# Patient Record
Sex: FEMALE | URBAN - METROPOLITAN AREA
[De-identification: names, ages, dates, MRNs, and addresses within clinical notes are randomized per-mention and may not be internally consistent; named-entity substitution may affect disease eponyms.]

---

## 2017-08-23 ENCOUNTER — TRANSFERRED RECORDS (OUTPATIENT)
Dept: HEALTH INFORMATION MANAGEMENT | Facility: CLINIC | Age: 77
End: 2017-08-23

## 2017-10-26 ENCOUNTER — TRANSFERRED RECORDS (OUTPATIENT)
Dept: HEALTH INFORMATION MANAGEMENT | Facility: CLINIC | Age: 77
End: 2017-10-26

## 2017-11-02 ENCOUNTER — PRE VISIT (OUTPATIENT)
Dept: OTOLARYNGOLOGY | Facility: CLINIC | Age: 77
End: 2017-11-02

## 2017-11-02 ENCOUNTER — MEDICAL CORRESPONDENCE (OUTPATIENT)
Dept: HEALTH INFORMATION MANAGEMENT | Facility: CLINIC | Age: 77
End: 2017-11-02

## 2017-11-02 DIAGNOSIS — C05.0 SQUAMOUS CELL CARCINOMA OF HARD PALATE (H): Primary | ICD-10-CM

## 2017-11-02 NOTE — TELEPHONE ENCOUNTER
1.  Date/reason for appt: 11/10/2107, SCC of the hard palate    2.  Referring provider: Dr. Pradip Cox    3.  Call to patient (Yes / No - short description): Yes, called patient to schedule consult    4.  Previous care at / records requested from: [facility] (records received with referral, faxed request for slides)

## 2017-11-06 LAB — COPATH REPORT: NORMAL

## 2017-11-06 PROCEDURE — 00000346 ZZHCL STATISTIC REVIEW OUTSIDE SLIDES TC 88321: Performed by: OTOLARYNGOLOGY

## 2017-11-10 ENCOUNTER — HOSPITAL ENCOUNTER (OUTPATIENT)
Dept: PET IMAGING | Facility: CLINIC | Age: 77
Discharge: HOME | End: 2017-11-10
Attending: OTOLARYNGOLOGY | Admitting: OTOLARYNGOLOGY
Payer: MEDICARE

## 2017-11-10 ENCOUNTER — TEAM CONFERENCE (OUTPATIENT)
Dept: OTOLARYNGOLOGY | Facility: CLINIC | Age: 77
End: 2017-11-10
Attending: OTOLARYNGOLOGY

## 2017-11-10 ENCOUNTER — HOSPITAL ENCOUNTER (OUTPATIENT)
Facility: CLINIC | Age: 77
End: 2017-11-10
Attending: INTERNAL MEDICINE | Admitting: INTERNAL MEDICINE

## 2017-11-10 ENCOUNTER — SURGERY (OUTPATIENT)
Age: 77
End: 2017-11-10

## 2017-11-10 ENCOUNTER — HOSPITAL ENCOUNTER (OUTPATIENT)
Dept: PET IMAGING | Facility: CLINIC | Age: 77
End: 2017-11-10
Attending: OTOLARYNGOLOGY
Payer: MEDICARE

## 2017-11-10 ENCOUNTER — HOSPITAL ENCOUNTER (OUTPATIENT)
Facility: CLINIC | Age: 77
Discharge: HOME | End: 2017-11-10
Attending: INTERNAL MEDICINE | Admitting: INTERNAL MEDICINE
Payer: MEDICARE

## 2017-11-10 VITALS
WEIGHT: 180 LBS | OXYGEN SATURATION: 96 % | RESPIRATION RATE: 16 BRPM | BODY MASS INDEX: 33.13 KG/M2 | SYSTOLIC BLOOD PRESSURE: 120 MMHG | DIASTOLIC BLOOD PRESSURE: 62 MMHG | HEART RATE: 68 BPM | HEIGHT: 62 IN | TEMPERATURE: 98.2 F

## 2017-11-10 DIAGNOSIS — T18.108A FOREIGN BODY IN ESOPHAGUS, INITIAL ENCOUNTER: ICD-10-CM

## 2017-11-10 DIAGNOSIS — K20.90 ACUTE ESOPHAGITIS: ICD-10-CM

## 2017-11-10 DIAGNOSIS — C05.0 SQUAMOUS CELL CARCINOMA OF HARD PALATE (H): ICD-10-CM

## 2017-11-10 DIAGNOSIS — C05.1 SQUAMOUS CELL CARCINOMA OF SOFT PALATE (H): ICD-10-CM

## 2017-11-10 LAB
GLUCOSE BLDC GLUCOMTR-MCNC: 105 MG/DL (ref 70–99)
UPPER GI ENDOSCOPY: NORMAL

## 2017-11-10 PROCEDURE — 99285 EMERGENCY DEPT VISIT HI MDM: CPT | Mod: Z6 | Performed by: INTERNAL MEDICINE

## 2017-11-10 PROCEDURE — 78816 PET IMAGE W/CT FULL BODY: CPT | Mod: PI

## 2017-11-10 PROCEDURE — 82962 GLUCOSE BLOOD TEST: CPT

## 2017-11-10 PROCEDURE — 74176 CT ABD & PELVIS W/O CONTRAST: CPT

## 2017-11-10 PROCEDURE — A9552 F18 FDG: HCPCS | Performed by: OTOLARYNGOLOGY

## 2017-11-10 PROCEDURE — 34300033 ZZH RX 343: Performed by: OTOLARYNGOLOGY

## 2017-11-10 PROCEDURE — 70491 CT SOFT TISSUE NECK W/DYE: CPT

## 2017-11-10 PROCEDURE — 70490 CT SOFT TISSUE NECK W/O DYE: CPT

## 2017-11-10 RX ORDER — FENTANYL CITRATE 50 UG/ML
INJECTION, SOLUTION INTRAMUSCULAR; INTRAVENOUS PRN
Status: DISCONTINUED | OUTPATIENT
Start: 2017-11-10 | End: 2017-11-10 | Stop reason: HOSPADM

## 2017-11-10 RX ORDER — ONDANSETRON 4 MG/1
4 TABLET, ORALLY DISINTEGRATING ORAL EVERY 8 HOURS PRN
Qty: 10 TABLET | Refills: 0 | Status: SHIPPED | OUTPATIENT
Start: 2017-11-10 | End: 2017-11-13

## 2017-11-10 RX ORDER — LIDOCAINE HYDROCHLORIDE 20 MG/ML
INJECTION, SOLUTION EPIDURAL; INFILTRATION; INTRACAUDAL; PERINEURAL
Status: DISCONTINUED
Start: 2017-11-10 | End: 2017-11-10 | Stop reason: HOSPADM

## 2017-11-10 RX ORDER — OMEPRAZOLE 40 MG/1
40 CAPSULE, DELAYED RELEASE ORAL 2 TIMES DAILY
Qty: 60 CAPSULE | Refills: 0 | Status: SHIPPED | OUTPATIENT
Start: 2017-11-10

## 2017-11-10 RX ORDER — SIMETHICONE
LIQUID (ML) MISCELLANEOUS PRN
Status: DISCONTINUED | OUTPATIENT
Start: 2017-11-10 | End: 2017-11-10 | Stop reason: HOSPADM

## 2017-11-10 RX ORDER — ONDANSETRON 2 MG/ML
4 INJECTION INTRAMUSCULAR; INTRAVENOUS ONCE
Status: COMPLETED | OUTPATIENT
Start: 2017-11-10 | End: 2017-11-10

## 2017-11-10 RX ADMIN — FENTANYL CITRATE 25 MCG: 50 INJECTION, SOLUTION INTRAMUSCULAR; INTRAVENOUS at 15:05

## 2017-11-10 RX ADMIN — FLUDEOXYGLUCOSE F-18 11.31 MCI.: 500 INJECTION, SOLUTION INTRAVENOUS at 08:38

## 2017-11-10 RX ADMIN — MIDAZOLAM HYDROCHLORIDE 1 MG: 1 INJECTION, SOLUTION INTRAMUSCULAR; INTRAVENOUS at 15:11

## 2017-11-10 RX ADMIN — Medication 2 ML: at 14:47

## 2017-11-10 RX ADMIN — MIDAZOLAM HYDROCHLORIDE 1 MG: 1 INJECTION, SOLUTION INTRAMUSCULAR; INTRAVENOUS at 15:09

## 2017-11-10 RX ADMIN — MIDAZOLAM HYDROCHLORIDE 1 MG: 1 INJECTION, SOLUTION INTRAMUSCULAR; INTRAVENOUS at 15:06

## 2017-11-10 RX ADMIN — FENTANYL CITRATE 25 MCG: 50 INJECTION, SOLUTION INTRAMUSCULAR; INTRAVENOUS at 15:09

## 2017-11-10 RX ADMIN — ONDANSETRON 4 MG: 2 INJECTION INTRAMUSCULAR; INTRAVENOUS at 16:37

## 2017-11-10 RX ADMIN — BENZOCAINE 1 SPRAY: 220 SPRAY, METERED PERIODONTAL at 15:05

## 2017-11-10 RX ADMIN — FENTANYL CITRATE 25 MCG: 50 INJECTION, SOLUTION INTRAMUSCULAR; INTRAVENOUS at 15:11

## 2017-11-10 ASSESSMENT — ENCOUNTER SYMPTOMS
VOMITING: 1
SHORTNESS OF BREATH: 0
NAUSEA: 1

## 2017-11-10 NOTE — OR NURSING
EGD done w/o interventions.  Fentanyl and versed for comfort.  On room air with VSS.  Report called to ED RN.

## 2017-11-10 NOTE — CONSULTS
Otolaryngology Consult Note  November 10, 2017      CC: foreign body in the throat    HPI: Krista Pelayo is a 77 year old female with a past medical history of a recently diagnosed cancer of her hard palate who presents to the emergency department today after swallowing a pill and feeling as though it is stuck in her throat. She reports she was here this morning for a PET scan and at approximately 11:20am she took her medications and feels as though the pill did not go down and is stuck in her throat. She feels nauseated and is spitting up her saliva. She reports that when she tries to swallow nothing goes down and she has to spit it up. She denies SOB, chest pain, palpitations. She reports a discomfort in her throat, but denies pain. She reports she does have some difficulty swallowing pills and that they have become stuck in the past, however, she has always been able to eventually get them down. She tolerates a regular diet at baseline, eating on the left side of her mouth due to pain along the right soft palate at the site of her cancer. ENT was consulted by Dr. Tello for further evaluation of the upper airway to assess for foreign body.     Patient is scheduled to see Dr. Alexander in ENT clinic today in regards to her recent diagnosis of palate cancer. Will defer further history of this due to current patient situation/discomfort.     Past Medical History:   Diagnosis Date     Cancer (H)      Hypertension        Past Surgical History:   Procedure Laterality Date     GENITOURINARY SURGERY      KIDNEY, STENT     ORTHOPEDIC SURGERY      ltk       No current outpatient prescriptions on file.          Allergies   Allergen Reactions     Ultram [Tramadol] Swelling     Morphine Rash       Social History     Social History     Marital status:      Spouse name: N/A     Number of children: N/A     Years of education: N/A     Occupational History     Not on file.     Social History Main Topics     Smoking  "status: Former Smoker     Smokeless tobacco: Never Used     Alcohol use Yes      Comment: 2x a month     Drug use: No     Sexual activity: Not on file     Other Topics Concern     Not on file     Social History Narrative     No narrative on file       History reviewed. No pertinent family history.    ROS: 12 point review of systems is negative unless noted in HPI.    PHYSICAL EXAM:  General: sitting at edge of bed, Spitting up saliva into a bag, in mild distress/discomfort  BP (!) 170/92  Pulse 104  Temp 98.2  F (36.8  C)  Resp 9  Ht 1.575 m (5' 2\")  Wt 81.6 kg (180 lb)  SpO2 98%  BMI 32.92 kg/m2  HEAD: normocephalic, atraumatic  Face: symmetrical, CN VII intact bilaterally (HB 1), no swelling, edema, or erythema.   Eyes: EOMI,  clear sclera  Ears: external auricles appear normal  Nose: no anterior drainage, intact and midline septum without perforation or hematoma   Mouth: moist, no ulcers, no jaw or tooth tenderness, tongue midline and symmetric. Right hard palate with plaque-like lesion, no bleeding or drainage.   Oropharynx: clear, tonsils within normal limits, uvula midline, no oropharyngeal erythema  Neck: no LAD, no swelling, trachea midline  Neuro: cranial nerves 2-12 grossly intact    FIBEROPTIC ENDOSCOPY:  Due to possible foreign body, fiberoptic laryngoscopy was indicated. After obtaining verbal consent, the nose was topically decongested and anesthetized. The fiberoptic laryngoscope was passed under endoscopic vision through the right nasal passage. The turbinates were normal. The inferior and middle meati were clear without purulence, masses, or polyps. The nasopharynx was clear. The eustachian tubes were clear. The soft palate appeared normal with good mobility. The epiglottis was sharp, and the visualized portion of the vallecula was clear. The larynx was clear with mobile cords. The left cord was hyperemic/erythematous, but no lesions or swelling noted. The arytenoids were clear, and there was " no pooling in the hypopharynx. No foreign body in the upper airway. No inflammation at the esophageal introitus.     ROUTINE IP LABS (Last four results)  BMPNo lab results found in last 7 days.  CBCNo lab results found in last 7 days.  INRNo lab results found in last 7 days.    Imaging:  Results for orders placed or performed during the hospital encounter of 11/10/17   PET Oncology Whole Body    Narrative    Combined Report of: PET and CT on  11/10/2017 8:47 AM :    1. PET of the neck, chest, abdomen, and pelvis.  2. PET CT Fusion for Attenuation Correction and Anatomical  Localization:    3. Diagnostic CT scan of the chest, abdomen, and pelvis without  intravenous contrast for interpretation.  3. CT of the chest, abdomen and pelvis obtained for diagnostic  interpretation.  4. 3D MIP and PET-CT fused images were processed on an independent  workstation and archived to PACS and reviewed by a radiologist.    Technique:    1. PET: The patient received 11.31 mCi of F-18-FDG; the serum glucose  was 105 prior to administration, body weight was 81.8 kg. Images were  evaluated in the axial, sagittal, and coronal planes as well as the  rotational whole body MIP. Images were acquired from the Vertex to the  Feet.    UPTAKE WAS MEASURED AT 60 MINUTES.     2. CT: Volumetric acquisition for clinical interpretation of the  chest, abdomen, and pelvis acquired at 3 mm sections . The chest,  abdomen, and pelvis were evaluated at 5 mm sections in bone, soft  tissue, and lung windows.      3. 3D MIP and PET-CT fused images were processed on an independent  workstation and archived to PACS and reviewed by a radiologist.    INDICATION: Squamous cell carcinoma of hard palate     ADDITIONAL INFORMATION OBTAINED FROM EMR: Patient is a 77-year-old  female with lesion of the right hard palate, status post  radiofrequency excision, pathology consistent with squamous cell  carcinoma.    COMPARISON: None available    FINDINGS:      HEAD/NECK:  Please refer to dedicated head and neck PET/CT for full findings in  the head and neck.    CHEST:  There is no suspicious FDG uptake in the chest.     There are no pathologically enlarged mediastinal, hilar or axillary  lymph nodes.  There are no suspicious lung nodules or evidence for infection.     There is no significant pericardial or plural effusions.    ABDOMEN AND PELVIS:  Right inguinal lymph node measures 11 mm short axis diameter (series 3  image 288) with slightly increased metabolic activity with maximum SUV  5.31. Additional right inguinal lymph node measures 7 mm short axis  diameter (series 3 image 29), maximum SUV 3.64.    There are no suspicious hepatic lesions. There is no splenomegaly or  evidence for splenic or pancreatic mass lesion.  There are no suspicious adrenal mass lesions or opaque gallbladder  calculi.  The left kidney is absent, favored to be surgically absent  given probably adjacent surgical clips. Nodularity along the left  peritoneal reflection measures up to 1.8 cm (series 3 image 180),  without associated FDG avidity. Additional fatty density mass with  peripheral calcification adjacent to the left psoas muscle measures up  to 2.7 cm (series 3 image 27), no associated FDG avidity. Unenhanced  evaluation of the left kidney is within normal limits. Subcentimeter  renal hypodensities too small to definitely characterize, but may  represent simple renal cysts. Mild pelviectasis. No hydronephrosis or  hydroureter. Urinary bladder is largely decompressed. Surgical changes  of hysterectomy and bilateral salpingo-oophorectomy.    The bowel is nondilated. The appendix is normal. Colonic  diverticulosis without diverticulitis.    LOWER EXTREMITIES:   No abnormal masses or hypermetabolic lesions. Surgical changes of  right total knee arthroplasty. Left knee effusion with increased  metabolic activity within the effusion, maximum SUV 3.9.    UPPER EXTREMITIES: Degenerative  changes of the shoulders with  bilateral glenohumeral joint effusions with increased metabolic  activity, maximum SUV 7.97 on the right and 4.11 on the left.    BONES:   There are no suspicious lytic or blastic osseous lesions. Extensive  degenerative changes of the spine with grade 1 anterolisthesis of L2  on L3. No acute osseous abnormalities or suspicious osseous lesions.  There is no abnormal FDG uptake in the skeleton.    SOFT TISSUES: Slight skin thickening and increased metabolic activity  in the superficial soft tissues overlying the right and left  calcaneus, measuring up to 5.09 maximum SUV. With additional increased  soft tissue uptake in the superficial soft tissues of the feet, right  greater than left.      Impression    IMPRESSION:   1.  In this patient with history of squamous cell carcinoma of the  hard palate, there is no convincing evidence for metastatic disease in  the chest, abdomen or pelvis.  a. Please refer to dedicated neck PET/CT for full findings in the head  and neck.  2. Bilateral shoulder joint effusions and left knee joint effusion  demonstrate increased metabolic activity, which may represent  superimposed infectious or inflammatory process in the appropriate  clinical context.  3.  Skin thickening an increased metabolic activity in the superficial  soft tissues overlying the right and left calcaneus with additional  increased metabolic activity in the superficial soft tissues of the  feet, which may represent dermatitis.  4. Hypermetabolic rectal uptake.  Although this is usually benign  hemorrhoids, fissure, physiologic excretion, underlying malignancy  remains consideration.   Consider direct visualization and attention  on follow-up PET scans.    4a. Right inguinal lymph nodes with increased metabolic activity  maintain normal morphology and may be reactive. Recommend attention on  follow-up examinations.    I have personally reviewed the examination and initial  interpretation  and I agree with the findings.    CHINO FREGOSO MD   CT Soft Tissue Neck w/o Contrast    Narrative    PET CT fusion examination  1. Neck CT without contrast  2. PET study of the neck  3. PET CT fusion study of the neck    History: Squamous cell carcinoma of soft palate (H) . Per chart  review, patient with history of right hard palate lesion, status post  radiofrequency excision.    Comparison: none    Technique: Please refer to the accompanying whole body PET-CT for  report of the dose and whole body PET-CT findings.  Regarding the neck, axial images were obtained with sagittal and  coronal reconstructions performed. Neck CT images were reviewed in  bone, soft tissue, and lung windows, with review of the fused PET-CT  images as well in multiple planes.    Dose: 2200 mGy*cm    Findings:  Evaluation is somewhat limited given lack of IV contrast. Dental  amalgam results in streak artifact.    Regarding evaluation of the mucosal space, there is no abnormal  metabolic uptake on PET CT in the nasopharynx, oropharynx,  hypopharynx, or of the glottis. Regarding the major salivary glands,  no abnormality is identified. Regarding the thyroid gland, no  abnormality is identified.    Right IIa lymph node measures 1.3 x 0.9 cm, maximum SUV 4.65.  Right  axillary lymph node measures 0.6 cm short axis diameter, maximum SUV  2.69.    Multilevel degenerative change of the cervical spine with at least  mild spinal canal narrowing at C6-7. No severe spinal canal or neural  foraminal stenosis. Ossification of the soft tissues posterior to the  spinous processes at C5 and C6. No acute fracture or dislocation.  Regarding the visualized paranasal sinuses, there is no evidence of  significant debris or fluid. Regarding the mastoid air cells, there is  no evidence of significant debris or fluid. Regarding the major  vasculature in the neck, no abnormality is identified.    Regarding the visualized lung apices, there  is no definite  abnormality, and the lung apices appear relatively clear.      Impression    Impression:   1. On the fusion PET CT, there is increased metabolic activity and  right IIa lymph node, maximum SUV 4.65. This is indeterminate,  unlikely to represent metastasis given low level FDG uptake and its  normal size.   2. Please refer to the whole body PET CT performed as a separate  report, for the findings of the remainder of the body.    I have personally reviewed the examination and initial interpretation  and I agree with the findings.    HENRIETTA COTTER MD         Assessment and Plan  Krista Pelayo is a 77 year old female with a past medical history of recently diagnosed cancer of her hard palate who presents to the emergency department today after swallowing a pill and feeling as though it is stuck in her throat. Flexible laryngoscopy was grossly normal without evidence of foreign body. Deferred further questions related to her head and neck cancer as she has scheduled follow up with Dr. Alexander and patient is in acute discomfort.  - No acute ENT intervention indicated  - Recommend GI consult for further evaluation of the esophagus  - Keep NPO until further evaluation   - Remainder of care per ED. ENT will reschedule appointment with Dr. Alexander to a later date.     -- Patient and above plan discussed with Dr. Catherine Horvath PA-C  Otolaryngology-Head & Neck Surgery  Please page ENT with questions by dialing [phone] and entering job code [phone] when prompted.\

## 2017-11-10 NOTE — BRIEF OP NOTE
[facility], [place]    Brief Operative Note    Pre-operative diagnosis: Foreign body  Post-operative diagnosis Severe esophagitis, no foreign body   Procedure: Procedure(s):   - Wound Class: II-Clean Contaminated  Surgeon: Surgeon(s) and Role:     * Matt Zarate MD - Primary  Anesthesia: Conscious Sedation   Estimated blood loss: Less than 10 ml  Drains: None  Specimens: * No specimens in log *  Findings:  Severe esophagitis. No obstruction or stenosis identified. Mild esophageal bleeding due to retching and manipulation with no active bleeding at the end of the procedure. Exam otherwise normal.  Complications: None.  Implants: None.    Recommendations:  - Use high dose PPI (Omeprazole 40mg PO BID) for 2-3 months  - Prescribe viscous lidocaine TID PRN for discomfort post-procedure  - Liquid to soft diet for the next 2-3 days, followed by diet as tolerated  - Swallow precautions with PO intake  - Plan for repeat EGD in 2-4 weeks with MAC    Above discussed with patient and her daughter.  They would like repeat endscopy to be done closer to [place], and will discuss scheduling the procedure with PCP.  They expressed understanding that repeat endoscopy should be done within the next month and with MAC.

## 2017-11-10 NOTE — ED NOTES
Pt presents for evaluation of foreign body in her throat after feeling like a BP pill lodged in her throat while at lunch today. She had a PET scan this AM for evaluation of a cancerous lesion found in her mouth and was eating lunch when she tried to take her medication. She is moving air well but nauseated and dry heaving and unable to get the pill to move up or down.

## 2017-11-10 NOTE — ED AVS SNAPSHOT
[facility], Emergency Department    [address] [address]    Phone:  [phone]                                       Krista Pelayo   MRN: [id number]    Department:  [facility], Emergency Department   Date of Visit:  11/10/2017           Patient Information     Date Of Birth          1940        Your diagnoses for this visit were:     Foreign body in esophagus, initial encounter     Acute esophagitis        You were seen by Anastacio Tello MD.        Discharge Instructions         Esophagitis     With esophagitis, the lining of the esophagus is inflamed.   Do you often have burning pain in your chest? You may have esophagitis. This is when the lining of the esophagus becomes red and swollen (inflamed). The esophagus is the tube that connects your throat to your stomach. This sheet tells you more about esophagitis. It also explains your treatment options.  Main types of esophagitis  Reflux esophagitis. This is the more common type. It is caused by GERD (gastroesophageal reflux disease). Stomach contents with stomach acid flow back up into the esophagus. This happens over and over. It leads to inflammation. Risk factors can include:    Being overweight    Asthma    Smoking    Pregnancy    Frequent vomiting    Certain medicines (such as aspirin and other anti-inflammatories)    Hiatal hernia  Infectious esophagitis. This is caused by an infection. You are more at risk for this if you have a weakened immune system and poor nutrition. Antibiotic use can also be a factor. The infection is often due to the following:    A type of fungus (typically candida)    A virus, such as herpes simplex virus 1 (HSV-1) or cytomegalovirus (CMV)  Eosinophilic esophagitis. Foods or other things around you can give you an allergic reaction. This triggers an immune response and leads to esophagitis.  Pill-induced esophagitis. Certain types of medicines can cause inflammation and ulcers in the esophagus.  These include doxycycline, aspirin, NSAIDs, alendronate, potassium, quinidine, iron.  Symptoms of esophagitis  The following symptoms can occur with esophagitis:    Pain when swallowing, or trouble swallowing    Pain behind your breastbone (heartburn)    Acid regurgitation    Chronic sore throat    Gum Inflammation    Cavities    Bad breath    Nausea    Pain in your upper belly (abdomen)    Bleeding (indicated by bright red vomit or black, tarry stool)  These symptoms occur more often with reflux esophagitis:    Coughing, wheezing, or asthma    Hoarseness  Diagnosis of esophagitis  Your healthcare provider will ask about your health history and symptoms. You ll also be examined. Sometimes certain tests are needed. These may include:    Upper endoscopy. A thin, flexible tube with a tiny light and camera is used. It is inserted through the mouth down into the esophagus. This lets the provider look for damage. A small sample of tissue (biopsy) may also be removed. The sample is sent to a lab for testing.    Upper GI X-ray with barium. An X-ray is done after you drink a substance called barium. Barium may make problems in the esophagus easier to see on an x-ray.    Esophageal pH. A soft, thin tube is passed into the esophagus through the nose or mouth for 24 hours. It measures the acid level in the esophagus.    Esophageal manometry. A soft, thin tube is passed into the esophagus through the nose or mouth. It measures muscle contractions in the esophagus.  Treatment of esophagitis  Medicines. Different medicines can help treat esophagitis. The medicine used will depend on the type of esophagitis you have. Talk with your healthcare provider.  Lifestyle changes. Making the following changes can help reduce irritation and ease your symptoms:    Avoid spicy foods (pepper, chili powder, mota). Also avoid hard foods (nuts, crackers, raw vegetables) and acidic foods and drinks (tomatoes, citrus fruits and juices). Other  problem foods include chocolate, peppermint, nutmeg, and foods high in fat.    Until you can swallow without pain, follow a combined liquid and soft diet. Try foods such as cooked cereals, mashed potatoes, and soups.    Take small bites and chew your food thoroughly.    Avoid large meals and heavy evening meals. Don't lie down within 2 to 3 hours of eating.    Get to or stay at a healthy weight.    Avoid alcohol, caffeine, and smoking or tobacco products.    Brush and floss your teeth    Raise your upper body by 4 to 6 inches when lying in bed. This can be done using a foam wedge. Or put blocks under the legs at the head of your bed.  Surgery. This may be needed for severe reflux esophagitis. Other noninvasive procedures to treat GERD and esophagitis are being studied. Your provider can tell you more.  Why treatment Is important  Without treatment, esophagitis can get worse. This is especially true with severe reflux esophagitis. For instance, continued symptoms can cause scarring of the esophagus. Over time, this can cause a narrowing the esophagus (stricture). This can make it hard to pass food down to the stomach. As symptoms go on they can also cause changes in the lining of the esophagus. These changes can put you at a slightly higher risk of cancer of the esophagus.   Date Last Reviewed: 7/1/2016 2000-2017 [employer] [address] [place] [address], [place] [address]. All rights reserved. This information is not intended as a substitute for professional medical care. Always follow your healthcare professional's instructions.          Esophageal Blockage, Resolved  The esophagus is the passage that carries food from the mouth to the stomach. You had a blockage in the esophagus. This can happen after swallowing a large piece of food, taking a large pill, or swallowing foreign objects.  If this is a recurring problem, it can be a sign of disease in the esophagus, such as inflammation (swelling and  irritation) or scarring. If you did not have a special procedure (endoscopy) today to treat your condition, further testing will be needed to evaluate this problem.  The blockage has cleared. You should be able to swallow normally again.  Home care    For the next 24 hours you may drink liquids and eat soft foods.    You may have been given medicine today to prevent pain and help you relax. If so, you may feel drowsy for the next 4 to 12 hours. Do not drive or operate dangerous equipment until you feel alert again.    If your esophagus was blocked by food, be sure to cut solid food into small pieces before putting it into your mouth. Chew all foods well before swallowing.    If your esophagus was blocked by an over-the-counter pill (such as a vitamin), avoid this size pill in the future. If it was blocked by a prescription medicine, ask your healthcare provider for another form of medicine.  Follow-up care  Follow up with your healthcare provider, or as advised. If you continue to have problems, contact your doctor or this facility for advice. If this is a recurring problem, talk to your healthcare provider about it. He or she may suggest having an endoscopy. This is a look in the esophagus with a small camera and light in a narrow, flexible tube).  When to seek medical advice  Call your healthcare provider right away if any of these occur:    Unable to swallow    Significant pain on swallowing    Fever of 100.4 F (38 C) or higher, or as directed by your healthcare provider  Call 911  Call 911 or get immediate medical care if any of the following occur:     Chest pain or shortness of breath    Vomiting blood (red or black)    Blood in your stool (dark red or black color)   Date Last Reviewed: 5/1/2017 2000-2017 [employer]_. [address] [place] [address], [place] [address]. All rights reserved. This information is not intended as a substitute for professional medical care. Always follow your healthcare  professional's instructions.      Please make an appointment to follow up with Your GI in [place] in 21-28 days for follow up Endoscope with MAC even if entirely better.     Please make an appointment to follow up with [facility] (phone: [phone]) as soon as possible even if entirely better.     24 Hour Appointment Hotline       To make an appointment at any [facility], call [phone] ([phone]). If you don't have a family doctor or clinic, we will help you find one. [facility] are conveniently located to serve the needs of you and your family.             Review of your medicines      START taking        Dose / Directions Last dose taken    lidocaine (viscous) 2 % solution   Commonly known as:  XYLOCAINE   Dose:  15 mL   Quantity:  100 mL        Take 15 mLs by mouth every 8 hours as needed for moderate pain swish and spit every 3-8 hours as needed; max 8 doses/24 hour period   Refills:  0        omeprazole 40 MG capsule   Commonly known as:  priLOSEC   Dose:  40 mg   Quantity:  60 capsule        Take 1 capsule (40 mg) by mouth 2 times daily   Refills:  0        ondansetron 4 MG ODT tab   Commonly known as:  ZOFRAN ODT   Dose:  4 mg   Quantity:  10 tablet        Take 1 tablet (4 mg) by mouth every 8 hours as needed for nausea   Refills:  0                Prescriptions were sent or printed at these locations (3 Prescriptions)                   Other Prescriptions                Printed at Department/Unit printer (3 of 3)         omeprazole (PRILOSEC) 40 MG capsule               lidocaine, viscous, (XYLOCAINE) 2 % solution               ondansetron (ZOFRAN ODT) 4 MG ODT tab                Orders Needing Specimen Collection     None      Pending Results     No orders found from 11/8/2017 to 11/11/2017.            Pending Culture Results     No orders found from 11/8/2017 to 11/11/2017.            Pending Results Instructions     If you had any lab results that were not  "finalized at the time of your Discharge, you can call the ED Lab Result RN at [phone]. You will be contacted by this team for any positive Lab results or changes in treatment. The nurses are available 7 days a week from 10A to 6:30P.  You can leave a message 24 hours per day and they will return your call.        Thank you for choosing [facility]       Thank you for choosing [facility] for your care. Our goal is always to provide you with excellent care. Hearing back from our patients is one way we can continue to improve our services. Please take a few minutes to complete the written survey that you may receive in the mail after you visit with us. Thank you!        [employer] Information     [employer] lets you send messages to your doctor, view your test results, renew your prescriptions, schedule appointments and more. To sign up, go to [email or web address] . Click on \"Log in\" on the left side of the screen, which will take you to the Welcome page. Then click on \"Sign up Now\" on the right side of the page.     You will be asked to enter the access code listed below, as well as some personal information. Please follow the directions to create your username and password.     Your access code is: [email or web address]  Expires: 2018  5:30 AM     Your access code will  in 90 days. If you need help or a new code, please call your [facility] clinic or [phone].        Care EveryWhere ID     This is your Care EveryWhere ID. This could be used by other organizations to access your [facility] medical records  [id number]        Equal Access to Services     PAULETTE BOSWELL : Hadwaqas Farooq, waaxda luqadaha, qaybta kaalmada graeme guidry. So [facility] [phone].    ATENCIÓN: Si habla español, tiene a rosas disposición servicios gratuitos de asistencia lingüística. Llame al [phone].    We comply with applicable federal civil rights laws and [place] laws. We do not " discriminate on the basis of race, color, national origin, age, disability, sex, sexual orientation, or gender identity.            After Visit Summary       This is your record. Keep this with you and show to your community pharmacist(s) and doctor(s) at your next visit.

## 2017-11-10 NOTE — ED AVS SNAPSHOT
[facility], [place], Emergency Department    [address] [place] [address]    Phone:  [phone]                                       Krista Pelayo   MRN: [id number]    Department:  [facility], Emergency Department   Date of Visit:  11/10/2017           After Visit Summary Signature Page     I have received my discharge instructions, and my questions have been answered. I have discussed any challenges I see with this plan with the nurse or doctor.    ..........................................................................................................................................  Patient/Patient Representative Signature      ..........................................................................................................................................  Patient Representative Print Name and Relationship to Patient    ..................................................               ................................................  Date                                            Time    ..........................................................................................................................................  Reviewed by Signature/Title    ...................................................              ..............................................  Date                                                            Time

## 2017-11-10 NOTE — DISCHARGE INSTRUCTIONS
Esophagitis     With esophagitis, the lining of the esophagus is inflamed.   Do you often have burning pain in your chest? You may have esophagitis. This is when the lining of the esophagus becomes red and swollen (inflamed). The esophagus is the tube that connects your throat to your stomach. This sheet tells you more about esophagitis. It also explains your treatment options.  Main types of esophagitis  Reflux esophagitis. This is the more common type. It is caused by GERD (gastroesophageal reflux disease). Stomach contents with stomach acid flow back up into the esophagus. This happens over and over. It leads to inflammation. Risk factors can include:    Being overweight    Asthma    Smoking    Pregnancy    Frequent vomiting    Certain medicines (such as aspirin and other anti-inflammatories)    Hiatal hernia  Infectious esophagitis. This is caused by an infection. You are more at risk for this if you have a weakened immune system and poor nutrition. Antibiotic use can also be a factor. The infection is often due to the following:    A type of fungus (typically candida)    A virus, such as herpes simplex virus 1 (HSV-1) or cytomegalovirus (CMV)  Eosinophilic esophagitis. Foods or other things around you can give you an allergic reaction. This triggers an immune response and leads to esophagitis.  Pill-induced esophagitis. Certain types of medicines can cause inflammation and ulcers in the esophagus. These include doxycycline, aspirin, NSAIDs, alendronate, potassium, quinidine, iron.  Symptoms of esophagitis  The following symptoms can occur with esophagitis:    Pain when swallowing, or trouble swallowing    Pain behind your breastbone (heartburn)    Acid regurgitation    Chronic sore throat    Gum Inflammation    Cavities    Bad breath    Nausea    Pain in your upper belly (abdomen)    Bleeding (indicated by bright red vomit or black, tarry stool)  These symptoms occur more often with reflux  esophagitis:    Coughing, wheezing, or asthma    Hoarseness  Diagnosis of esophagitis  Your healthcare provider will ask about your health history and symptoms. You ll also be examined. Sometimes certain tests are needed. These may include:    Upper endoscopy. A thin, flexible tube with a tiny light and camera is used. It is inserted through the mouth down into the esophagus. This lets the provider look for damage. A small sample of tissue (biopsy) may also be removed. The sample is sent to a lab for testing.    Upper GI X-ray with barium. An X-ray is done after you drink a substance called barium. Barium may make problems in the esophagus easier to see on an x-ray.    Esophageal pH. A soft, thin tube is passed into the esophagus through the nose or mouth for 24 hours. It measures the acid level in the esophagus.    Esophageal manometry. A soft, thin tube is passed into the esophagus through the nose or mouth. It measures muscle contractions in the esophagus.  Treatment of esophagitis  Medicines. Different medicines can help treat esophagitis. The medicine used will depend on the type of esophagitis you have. Talk with your healthcare provider.  Lifestyle changes. Making the following changes can help reduce irritation and ease your symptoms:    Avoid spicy foods (pepper, chili powder, mota). Also avoid hard foods (nuts, crackers, raw vegetables) and acidic foods and drinks (tomatoes, citrus fruits and juices). Other problem foods include chocolate, peppermint, nutmeg, and foods high in fat.    Until you can swallow without pain, follow a combined liquid and soft diet. Try foods such as cooked cereals, mashed potatoes, and soups.    Take small bites and chew your food thoroughly.    Avoid large meals and heavy evening meals. Don't lie down within 2 to 3 hours of eating.    Get to or stay at a healthy weight.    Avoid alcohol, caffeine, and smoking or tobacco products.    Brush and floss your teeth    Raise your  upper body by 4 to 6 inches when lying in bed. This can be done using a foam wedge. Or put blocks under the legs at the head of your bed.  Surgery. This may be needed for severe reflux esophagitis. Other noninvasive procedures to treat GERD and esophagitis are being studied. Your provider can tell you more.  Why treatment Is important  Without treatment, esophagitis can get worse. This is especially true with severe reflux esophagitis. For instance, continued symptoms can cause scarring of the esophagus. Over time, this can cause a narrowing the esophagus (stricture). This can make it hard to pass food down to the stomach. As symptoms go on they can also cause changes in the lining of the esophagus. These changes can put you at a slightly higher risk of cancer of the esophagus.   Date Last Reviewed: 7/1/2016 2000-2017 [employer]. [address] [place] [address]. All rights reserved. This information is not intended as a substitute for professional medical care. Always follow your healthcare professional's instructions.          Esophageal Blockage, Resolved  The esophagus is the passage that carries food from the mouth to the stomach. You had a blockage in the esophagus. This can happen after swallowing a large piece of food, taking a large pill, or swallowing foreign objects.  If this is a recurring problem, it can be a sign of disease in the esophagus, such as inflammation (swelling and irritation) or scarring. If you did not have a special procedure (endoscopy) today to treat your condition, further testing will be needed to evaluate this problem.  The blockage has cleared. You should be able to swallow normally again.  Home care    For the next 24 hours you may drink liquids and eat soft foods.    You may have been given medicine today to prevent pain and help you relax. If so, you may feel drowsy for the next 4 to 12 hours. Do not drive or operate dangerous equipment until you feel  alert again.    If your esophagus was blocked by food, be sure to cut solid food into small pieces before putting it into your mouth. Chew all foods well before swallowing.    If your esophagus was blocked by an over-the-counter pill (such as a vitamin), avoid this size pill in the future. If it was blocked by a prescription medicine, ask your healthcare provider for another form of medicine.  Follow-up care  Follow up with your healthcare provider, or as advised. If you continue to have problems, contact your doctor or this facility for advice. If this is a recurring problem, talk to your healthcare provider about it. He or she may suggest having an endoscopy. This is a look in the esophagus with a small camera and light in a narrow, flexible tube).  When to seek medical advice  Call your healthcare provider right away if any of these occur:    Unable to swallow    Significant pain on swallowing    Fever of 100.4 F (38 C) or higher, or as directed by your healthcare provider  Call 911  Call 911 or get immediate medical care if any of the following occur:     Chest pain or shortness of breath    Vomiting blood (red or black)    Blood in your stool (dark red or black color)   Date Last Reviewed: 5/1/2017 2000-2017 [employer]. [address] [place] [address]. All rights reserved. This information is not intended as a substitute for professional medical care. Always follow your healthcare professional's instructions.      Please make an appointment to follow up with Your GI in [place] in 21-28 days for follow up Endoscope with MAC even if entirely better.     Please make an appointment to follow up with [facility] (phone: [phone]) as soon as possible even if entirely better.

## 2017-11-10 NOTE — ED PROVIDER NOTES
[place] EMERGENCY DEPARTMENT ([facility])  11/10/17   History     Chief Complaint   Patient presents with     Swallowed Foreign Body     blood pressure medication     HPI  Krista Pelayo is a 77 year old female with a medical history significant for oral cancer and hypertension who presents to the Emergency Department for evaluation of foreign body in her throat. Patient was having lunch today, when she attempted to take her spironolactone medication. The pill became stuck in her upper throat, and she was unable to move it up or down. She complains of nausea and dry heaving. She denies any breathing difficulties. She reports having a pill become lodged in the past; however, these have resolved on their own. Of note, patient had a PET scan done this morning for evaluation of a cancerous lision found in her mouth, and she has a consult visit scheduled with ENT this afternoon. Patient notes taking aspirin 81 mg daily. Denies any other blood thinners.    I have reviewed the Medications, Allergies, Past Medical and Surgical History, and Social History in the [employer] system.    Past Medical History:   Diagnosis Date     Cancer (H)      Hypertension        Past Surgical History:   Procedure Laterality Date     GENITOURINARY SURGERY      KIDNEY, STENT     ORTHOPEDIC SURGERY      ltk       History reviewed. No pertinent family history.    Social History   Substance Use Topics     Smoking status: Former Smoker     Smokeless tobacco: Never Used     Alcohol use Yes      Comment: 2x a month       No current facility-administered medications for this encounter.      Current Outpatient Prescriptions   Medication     omeprazole (PRILOSEC) 40 MG capsule     lidocaine, viscous, (XYLOCAINE) 2 % solution     ondansetron (ZOFRAN ODT) 4 MG ODT tab        Allergies   Allergen Reactions     Ultram [Tramadol] Swelling     Morphine Rash         Review of Systems   HENT:        Positive for foreign body stuck in throat  "  Respiratory: Negative for shortness of breath.    Gastrointestinal: Positive for nausea and vomiting (dry heaving).   All other systems reviewed and are negative.      Physical Exam   BP: (!) 138/91  Pulse: 104  Heart Rate: 75  Temp: 98.2  F (36.8  C)  Resp: 20  Height: 157.5 cm (5' 2\")  Weight: 81.6 kg (180 lb)  SpO2: 97 %      Physical Exam   Constitutional: She is oriented to person, place, and time. No distress.   HENT:   Head: Atraumatic.   Mouth/Throat: Oropharynx is clear and moist. No oropharyngeal exudate.   Eyes: Pupils are equal, round, and reactive to light. No scleral icterus.   Neck: Neck supple. No JVD present.   Cardiovascular: Normal rate, normal heart sounds and intact distal pulses.  Exam reveals no gallop and no friction rub.    No murmur heard.  Pulmonary/Chest: Effort normal and breath sounds normal. No respiratory distress. She has no wheezes. She has no rales. She exhibits no tenderness.   Abdominal: Soft. Bowel sounds are normal. She exhibits no distension. There is no tenderness. There is no rebound and no guarding.   Musculoskeletal: She exhibits no edema or tenderness.   Neurological: She is alert and oriented to person, place, and time. No cranial nerve deficit. Coordination normal.   Skin: Skin is warm. No rash noted. She is not diaphoretic.       ED Course   12:35 PM  The patient was seen and examined by Anastacio Tello MD in Room ED06.     ED Course     Procedures        No results found for this visit on 11/10/17 (from the past 24 hour(s)).        Labs Ordered and Resulted from Time of ED Arrival Up to the Time of Departure from the ED - No data to display         Assessments & Plan (with Medical Decision Making)  Esophageal FB proximal with spironolactone pill in the pt with oral Ca, ENT consult done-no FB, then GI consult-EGD already pill gone but irritaion noted reflux vs eosinophilic esopahgitis per GI-recommended high dose PPI and viscus lido prn for symptom, pt will follow up " with GI in [place] 3-4 weeks for follow up EGD with MAC.       I have reviewed the nursing notes.    I have reviewed the findings, diagnosis, plan and need for follow up with the patient.    New Prescriptions    LIDOCAINE, VISCOUS, (XYLOCAINE) 2 % SOLUTION    Take 15 mLs by mouth every 8 hours as needed for moderate pain swish and spit every 3-8 hours as needed; max 8 doses/24 hour period    OMEPRAZOLE (PRILOSEC) 40 MG CAPSULE    Take 1 capsule (40 mg) by mouth 2 times daily    ONDANSETRON (ZOFRAN ODT) 4 MG ODT TAB    Take 1 tablet (4 mg) by mouth every 8 hours as needed for nausea       Final diagnoses:   Foreign body in esophagus, initial encounter   Acute esophagitis     I, Olegario Kennedy, am serving as a trained medical scribe to document services personally performed by Anastacio Tello MD, based on the provider's statements to me.   IAnastacio MD, was physically present and have reviewed and verified the accuracy of this note documented by Olegario Kennedy.    11/10/2017   [facility], [place], EMERGENCY DEPARTMENT     Anastacio Tello MD  11/10/17 [id number]

## 2017-11-10 NOTE — TELEPHONE ENCOUNTER
Head & Neck Tumor Conference Note     Status: New  Staff: Dr. Felipe    Tumor Site: Hard palate  Tumor Stage: SCC    Brief History: 77 y.o woman with persistent lesion on hard palate after an allergic reaction to diflucan. Biopsy of the lesion was obtained which was positive for SCC  Reason for Review: Review pathology    Imaging:   None  Pathology:   Received: 11/6/2017   Reported: 11/6/2017 18:33   Ordering Phy(s): JULIANN FELIPE   Additional Phy(s): [facility], Department of Pathology     For improved result formatting, select 'View Enhanced Report Format'   under Linked Documents section.     TEST(S):   15 slides, case #[id number]     FINAL DIAGNOSIS:   CASE FROM [place], [place] ([id number], OBTAINED 10/26/17):   Hard palate, lesion excision:   - Well differentiated squamous cell carcinoma   - Tumor extends to the lateral margins and base of the lesion   - GMS stain negative for fungal organisms     I have personally reviewed all specimens and/or slides, including the   listed special stains, and used them with my medical judgement to   determine or confirm the final diagnosis    Tumor Board Recommendation:   Discussion: lymph node is not quite positive on imaging and not very concerning. No palatal erosion.   Re-resection needed given the margins were positive  Plan: surgical resection         Nancy Burrell MD PGY-3  Otolaryngology- Head and Neck Surgery

## 2017-11-16 ENCOUNTER — TELEPHONE (OUTPATIENT)
Dept: GASTROENTEROLOGY | Facility: CLINIC | Age: 77
End: 2017-11-16

## 2017-11-16 DIAGNOSIS — K22.2 ESOPHAGEAL STRICTURE: ICD-10-CM

## 2017-11-16 DIAGNOSIS — K20.0 EOSINOPHILIC ESOPHAGITIS: Primary | ICD-10-CM

## 2017-11-16 NOTE — TELEPHONE ENCOUNTER
Per procedure recommendation: Recommend repeat EGD in 2-4 weeks to reassess interval                        esophageal mucosal healing, reassess for underlying                        stricture pathology and to exclude concomitant EE.    Please place order for scheduling.    Laura Almanzar RN

## 2017-11-20 ENCOUNTER — OFFICE VISIT (OUTPATIENT)
Dept: OTOLARYNGOLOGY | Facility: CLINIC | Age: 77
End: 2017-11-20

## 2017-11-20 ENCOUNTER — TELEPHONE (OUTPATIENT)
Dept: GASTROENTEROLOGY | Facility: CLINIC | Age: 77
End: 2017-11-20

## 2017-11-20 VITALS — HEART RATE: 56 BPM | HEIGHT: 61 IN | OXYGEN SATURATION: 96 % | WEIGHT: 183 LBS | BODY MASS INDEX: 34.55 KG/M2

## 2017-11-20 DIAGNOSIS — C05.9 CANCER OF PALATE (H): Primary | ICD-10-CM

## 2017-11-20 DIAGNOSIS — C05.0 SQUAMOUS CELL CARCINOMA OF HARD PALATE (H): ICD-10-CM

## 2017-11-20 RX ORDER — METOPROLOL SUCCINATE 50 MG/1
TABLET, EXTENDED RELEASE ORAL
COMMUNITY
Start: 2017-07-17

## 2017-11-20 RX ORDER — CALCIPOTRIENE 50 UG/G
CREAM TOPICAL
COMMUNITY
Start: 2017-09-11

## 2017-11-20 RX ORDER — BETAMETHASONE DIPROPIONATE 0.5 MG/G
OINTMENT, AUGMENTED TOPICAL
COMMUNITY
Start: 2017-09-11

## 2017-11-20 RX ORDER — TAZAROTENE 1 MG/G
CREAM TOPICAL
COMMUNITY
Start: 2017-09-11

## 2017-11-20 RX ORDER — MAGNESIUM OXIDE 400 MG/1
400 TABLET ORAL
COMMUNITY
Start: 2017-07-19 | End: 2018-03-23

## 2017-11-20 RX ORDER — SPIRONOLACTONE 25 MG/1
25 TABLET ORAL
COMMUNITY
Start: 2017-07-17

## 2017-11-20 RX ORDER — LOSARTAN POTASSIUM 50 MG/1
50 TABLET ORAL
COMMUNITY
Start: 2017-07-17

## 2017-11-20 RX ORDER — FERROUS SULFATE 325(65) MG
325 TABLET ORAL
COMMUNITY
Start: 2017-07-19 | End: 2018-03-23

## 2017-11-20 ASSESSMENT — PAIN SCALES - GENERAL: PAINLEVEL: NO PAIN (0)

## 2017-11-20 NOTE — PROGRESS NOTES
Dear Dr. Cox:    I had the pleasure of meeting Krista Pelayo in consultation today at the [facility] at your request.     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 Patient says that she had a lesion on her palate initially noticed 8 months ago by her dentist. She denies any significant change in size. She was seen by Dr. Cox who performed a biopsy which was benign. She was taken to the operating room by Dr. Cox who used the radiofrequency wand to resect the palatal lesion which was a 2 cm area of resection. Final pathology from this resection was consistent with an invasive squamous cell carcinoma with positive lateral and deep margins. She says she did not have any issues with the surgery. She denies any pain. She does notice a bump by her tooth. She denies any loose teeth. She denies any difficulty or pain with her swallowing related to the mass (recently did have a pill impaction and has fears about taking pills now), ear pain.     She is right-handed. She does have some problems with her dexterity in her fingers due to numbness and changes related to what she says is psoriasis.    Social history: She has smoked occasionally in the past, denies any chewing tobacco use, drinks occasional alcohol. 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 When not flaring, use only Saturday & Sunday.       calcipotriene (DOVONOX) 0.005 % cream Apply twice daily to palms and soles.       ferrous sulfate (IRON) 325 (65 FE) MG tablet Take 325 mg by mouth       losartan (COZAAR) 50 MG tablet Take 50 mg by mouth       magnesium oxide (MAG-OX) 400 MG tablet Take 400 mg by mouth       metoprolol (TOPROL-XL) 50 MG 24 hr tablet TAKE 1 CAPSULE BY MOUTH DAILY. DO NOT CRUSH OR CHEW       spironolactone (ALDACTONE) 25 MG tablet Take 25 mg by mouth       tazarotene 0.1 % CREA Apply to palms and soles 3 times weekly       omeprazole (PRILOSEC) 40 MG capsule Take 1 capsule (40 mg) by mouth 2 times daily 60 capsule 0     lidocaine, viscous, (XYLOCAINE) 2 % solution Take 15 mLs by mouth every 8 hours as needed for moderate pain swish and spit every 3-8 hours as needed; max 8 doses/24 hour period 100 mL 0       ALLERGIES:    Allergies   Allergen Reactions     Atorvastatin      Myalgias     Codeine Nausea and Vomiting     chills     Fluconazole      Other reaction(s): Other  Lip swelling associated with sores      Hydrocodone-Acetaminophen Nausea and Vomiting     Meperidine Nausea and Vomiting     Pravastatin      Other reaction(s): Muscle weakness     Propoxyphene N-Apap Nausea and Vomiting     Ultram [Tramadol] Swelling     numbness face, arms, difficulity with speech     Adhesive Tape Rash     Latex Rash     Morphine Rash     No Clinical Screening - See Comments Rash and Swelling     ADHESIVE TAPE, NARCOTICS  red streaks on arms  TREES     Sulfa Drugs Rash       HABITS/SOCIAL HISTORY:   She has smoked occasionally in the past, denies any chewing tobacco use, drinks occasional alcohol.   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.     Social History     Social History     Marital status:      Spouse name: N/A     Number of children: N/A     Years of  "education: N/A     Occupational History     Not on file.     Social History Main Topics     Smoking status: Former Smoker     Smokeless tobacco: Never Used     Alcohol use Yes      Comment: 2x a month     Drug use: No     Sexual activity: Not on file     Other Topics Concern     Not on file     Social History Narrative       PAST MEDICAL HISTORY:   Past Medical History:   Diagnosis Date     Cancer (H)      Hypertension         PAST SURGICAL HISTORY:   Past Surgical History:   Procedure Laterality Date     ESOPHAGOSCOPY, GASTROSCOPY, DUODENOSCOPY (EGD), COMBINED N/A 11/10/2017    Procedure: COMBINED ESOPHAGOSCOPY, GASTROSCOPY, DUODENOSCOPY (EGD);;  Surgeon: Matt Zarate MD;  Location:  GI     GENITOURINARY SURGERY      KIDNEY, STENT     ORTHOPEDIC SURGERY      ltk       FAMILY HISTORY:  No family history on file.    REVIEW OF SYSTEMS:  12 point ROS was negative other than the symptoms noted above in the HPI.  Patient Supplied Answers to Review of Systems  [facility] ROS 11/20/2017   Ears, Nose, Throat Nasal congestion or drainage, Trouble swallowing, Hoarseness   Musculoskeletal Sore or stiff joints   Allergy/Immunology Allergies or hay fever, Skin changes, Rash   Other Rash, Problems with anesthesia in surgery         PHYSICAL EXAMINATION:   Pulse 56  Ht 1.55 m (5' 1.02\")  Wt 83 kg (183 lb)  SpO2 96%  BMI 34.55 kg/m2  Appearance:   normal; NAD, age-appropriate appearance, well-developed, normal habitus   Communication:   normal; communicates verbally, normal voice quality   Head/Face:   inspection -  Normal; no scars or visible lesions   Salivary glands -  Normal size, no tenderness, swelling, or palpable masses   Facial strength -  Normal and symmetric bilateral; H/B I/VI   Skin:  normal, no rash   Ocular Motility:  normal occular movements   Ears:  auricle (AD) -  normal  EAC (AD) -  normal  TM (AD) -  Normal, no effusion  auricle (AS) -  normal  EAC (AS) -  normal  TM (AS) -  Normal, no effusion  Normal " clinical speech reception   Nose:  Ext. inspection -  Normal  Internal Inspection -  Normal mucosa, septum, and turbinates   Nasopharynx:  normal mucosa, no masses   Oral Cavity:  lips -  Normal mucosa, oral competence, and stoma size   Age-appropriate dentition   Papillomatous changes along the right-side maxillary molar and premolar, papillomatous and leukoplakia changes along the right hard palate extending to approximately just short of the midline without extension towards the soft palate; there are areas of mucosa that is abnormal and on palpation is extremely soft but does not appear to have actual bony absence   Tongue - normal movement, no lesions   Oropharynx:  mucosa -  Normal, no lesions  soft palate -  Normal, no lesions, no asymmetry, normal elevation  tonsils -  Normal, no exudates, no abnormal lesions, symmetric   Hypopharynx:  Normal pyriform sinus and pharyngeal wall mucosa   No pooled secretions   Larynx:  Epiglottis, false vocal cord, true vocal cord normal in appearance, bilaterally mobile cords    Neck: No visible mass or asymmetry   Normal palpation, no tenderness, no tracheal deviation   Normal range of motion   Lymphatic:  no abnormal nodes   Cardiovascular:  warm, pink, well-perfused extremities without swelling, tenderness, or edema   Respiratory:  Normal respiratory effort, no stridor   Neuro/Psych.:  mood/affect -  normal  mental status -  normal  cranial nerves -  normal          PROCEDURES:   Flexible fiberoptic laryngoscopy: Scope exam was indicated due to history of palatal cancer. Verbal consent was obtained. The nasal cavity was prepped with an aerosolized solution of topical anesthetic and vasoconstrictive agent. The scope was passed through the anterior nasal cavity and advanced. Inspection of the nasopharynx revealed no gross abnormality. Inspection of the larynx revealed bilaterally mobile vocal cords. Pyriform sinuses are symmetric. No intra-arytenoid irregularities noted. The  epiglottis, aryepiglottic folds, vallecula and base of tongue are unremarkable. The airway is patent. Procedure tolerated well with no immediate complications noted.       RESULTS REVIEWED:   I reviewed the referral records from Dr. Cox which are summarized above    I independently reviewed the PET/CT scan images which showed no evidence of residual disease and no bony erosion of the maxilla        FINAL DIAGNOSIS:   CASE FROM [place], [place] ([id number], OBTAINED 10/26/17):   Hard palate, lesion excision:   - Well differentiated squamous cell carcinoma   - Tumor extends to the lateral margins and base of the lesion   - GMS stain negative for fungal organisms       Evaluation is somewhat limited given lack of IV contrast. Dental  amalgam results in streak artifact.     Regarding evaluation of the mucosal space, there is no abnormal  metabolic uptake on PET CT in the nasopharynx, oropharynx,  hypopharynx, or of the glottis. Regarding the major salivary glands,  no abnormality is identified. Regarding the thyroid gland, no  abnormality is identified.     Right IIa lymph node measures 1.3 x 0.9 cm, maximum SUV 4.65.  Right  axillary lymph node measures 0.6 cm short axis diameter, maximum SUV  2.69.     Multilevel degenerative change of the cervical spine with at least  mild spinal canal narrowing at C6-7. No severe spinal canal or neural  foraminal stenosis. Ossification of the soft tissues posterior to the  spinous processes at C5 and C6. No acute fracture or dislocation.  Regarding the visualized paranasal sinuses, there is no evidence of  significant debris or fluid. Regarding the mastoid air cells, there is  no evidence of significant debris or fluid. Regarding the major  vasculature in the neck, no abnormality is identified.     Regarding the visualized lung apices, there is no definite  abnormality, and the lung apices appear relatively clear.         Impression:   1. On the fusion PET CT, there is  increased metabolic activity and  right IIa lymph node, maximum SUV 4.65. This is indeterminate,  unlikely to represent metastasis given low level FDG uptake and its  normal size.   2. Please refer to the whole body PET CT performed as a separate  report, for the findings of the remainder of the body.      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 Her tumor was incompletely resected with positive margins both deeply and laterally. She comes here for further evaluation and definitive treatment. I discussed with her that given the positive margins a general recommendation would be for re-resection. I explained to her that we need to completely review her case at tumor board but we discussed some possible surgical scenarios. She may require an infrastructure maxillectomy on the right side. I did discuss with her that this would entail removing her molars on the right side. We discussed the options for reconstruction following thsi type of surgery include both a obturator versus a free soft tissue transfer. I explained to her some of the benefits and drawbacks of each of these options. She does have some dexterity issues with her hands secondary to rheumatologic issues. We did briefly discuss that the free flap reconstruction is certainly a larger surgery and much longer with a weeklong hospitalization as well as a prolonged period of NPO status of 2 weeks. We discussed that with the obturator there are risks of leakage with the device and that it requires the ability to place the device inside and outside of the mouth on a regular basis. Additionally we did discuss that she may require a neck dissection given that this is an oral cavity primary.    Microvascular reconstructive techniques were briefly counseled to the patient. In this procedure, tissue is harvested with its associated blood supply from a distant site of the body, and  then transplanted to the wound. The blood supply is then sutured with the aid of a microscope to an artery and vein near the wound so that the tissue can survive in its new environment. The main risk of the surgery is insult to the connected artery and vein, such as by a blood clot, which can then lead to flap death. In the case that a blood clot is detected, the patient will be taken directly to the operating room in an effort to salvage the flap tissue. The patient was counseled that he will be hospitalized for 7 days.     The patient is contemplating having her treatment after the holidays. I did explain to her that if she delays her care as certainly this risks of the tumor growing and resulting in a bigger surgery being required.    We will contact her after we review her case at tumor board.    Thank you very much for the opportunity to participate in the care of your patient.      Cee [place] M.D.  Otolaryngology- Head & Neck Surgery        CC:  Pradip Cox MD, [facility], [facility] [address], [address]  [place] [id number]

## 2017-11-20 NOTE — TELEPHONE ENCOUNTER
Message left for patient to call back to schedule upper endoscopy. See order.    Laura Almanzar RN

## 2017-11-20 NOTE — PATIENT INSTRUCTIONS
1. We will call you after tumor board on 12/1.   2. Please call the ENT clinic with any questions,concerns, new or worsening symptoms.    -Clinic number is [phone]   - Rekha's direct line (Dr. Alexander's nurse) [phone]

## 2017-11-20 NOTE — LETTER
11/20/2017       RE: Krista Pelayo  [address] [facility] [address]     Dear Colleague,    Thank you for referring your patient, Krista Pelayo, to the [facility] EAR NOSE AND THROAT at [facility]. Please see a copy of my visit note below.    Dear Dr. Cox:    I had the pleasure of meeting Krista Pelayo in consultation today at the [facility] at your request.     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 Patient says that she had a lesion on her palate initially noticed 8 months ago by her dentist. She denies any significant change in size. She was seen by Dr. Cox who performed a biopsy which was benign. She was taken to the operating room by Dr. Cox who used the radiofrequency wand to resect the palatal lesion which was a 2 cm area of resection. Final pathology from this resection was consistent with an invasive squamous cell carcinoma with positive lateral and deep margins. She says she did not have any issues with the surgery. She denies any pain. She does notice a bump by her tooth. She denies any loose teeth. She denies any difficulty or pain with her swallowing related to the mass (recently did have a pill impaction and has fears about taking pills now), ear pain.     She is right-handed. She does have some problems with her dexterity in her fingers due to numbness and changes related to what she says is psoriasis.    Social history: She has smoked occasionally in the past, denies any chewing tobacco use, drinks occasional alcohol. 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 When not flaring, use only Saturday & Sunday.       calcipotriene (DOVONOX) 0.005 % cream Apply twice daily to palms and soles.       ferrous sulfate (IRON) 325 (65 FE) MG tablet Take 325 mg by mouth       losartan (COZAAR) 50 MG tablet Take 50 mg by mouth       magnesium oxide (MAG-OX) 400 MG tablet Take 400 mg by mouth       metoprolol (TOPROL-XL) 50 MG 24 hr tablet TAKE 1 CAPSULE BY MOUTH DAILY. DO NOT CRUSH OR CHEW       spironolactone (ALDACTONE) 25 MG tablet Take 25 mg by mouth       tazarotene 0.1 % CREA Apply to palms and soles 3 times weekly       omeprazole (PRILOSEC) 40 MG capsule Take 1 capsule (40 mg) by mouth 2 times daily 60 capsule 0     lidocaine, viscous, (XYLOCAINE) 2 % solution Take 15 mLs by mouth every 8 hours as needed for moderate pain swish and spit every 3-8 hours as needed; max 8 doses/24 hour period 100 mL 0       ALLERGIES:    Allergies   Allergen Reactions     Atorvastatin      Myalgias     Codeine Nausea and Vomiting     chills     Fluconazole      Other reaction(s): Other  Lip swelling associated with sores      Hydrocodone-Acetaminophen Nausea and Vomiting     Meperidine Nausea and Vomiting     Pravastatin      Other reaction(s): Muscle weakness     Propoxyphene N-Apap Nausea and Vomiting     Ultram [Tramadol] Swelling     numbness face, arms, difficulity with speech     Adhesive Tape Rash     Latex Rash     Morphine Rash     No Clinical Screening - See Comments Rash and Swelling     ADHESIVE TAPE, NARCOTICS  red streaks on arms  TREES     Sulfa Drugs Rash       HABITS/SOCIAL HISTORY:   She has  "smoked occasionally in the past, denies any chewing tobacco use, drinks occasional alcohol.   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.     Social History     Social History     Marital status:      Spouse name: N/A     Number of children: N/A     Years of education: N/A     Occupational History     Not on file.     Social History Main Topics     Smoking status: Former Smoker     Smokeless tobacco: Never Used     Alcohol use Yes      Comment: 2x a month     Drug use: No     Sexual activity: Not on file     Other Topics Concern     Not on file     Social History Narrative       PAST MEDICAL HISTORY:   Past Medical History:   Diagnosis Date     Cancer (H)      Hypertension         PAST SURGICAL HISTORY:   Past Surgical History:   Procedure Laterality Date     ESOPHAGOSCOPY, GASTROSCOPY, DUODENOSCOPY (EGD), COMBINED N/A 11/10/2017    Procedure: COMBINED ESOPHAGOSCOPY, GASTROSCOPY, DUODENOSCOPY (EGD);;  Surgeon: Matt Zarate MD;  Location:  GI     GENITOURINARY SURGERY      KIDNEY, STENT     ORTHOPEDIC SURGERY      ltk       FAMILY HISTORY:  No family history on file.    REVIEW OF SYSTEMS:  12 point ROS was negative other than the symptoms noted above in the HPI.  Patient Supplied Answers to Review of Systems  [facility] ENT ROS 11/20/2017   Ears, Nose, Throat Nasal congestion or drainage, Trouble swallowing, Hoarseness   Musculoskeletal Sore or stiff joints   Allergy/Immunology Allergies or hay fever, Skin changes, Rash   Other Rash, Problems with anesthesia in surgery         PHYSICAL EXAMINATION:   Pulse 56  Ht 1.55 m (5' 1.02\")  Wt 83 kg (183 lb)  SpO2 96%  BMI 34.55 kg/m2  Appearance:   normal; NAD, age-appropriate appearance, well-developed, normal habitus   Communication:   normal; communicates verbally, normal voice quality   Head/Face:   inspection -  Normal; no scars or visible lesions   Salivary glands -  Normal size, no tenderness, swelling, or palpable masses   Facial " strength -  Normal and symmetric bilateral; H/B I/VI   Skin:  normal, no rash   Ocular Motility:  normal occular movements   Ears:  auricle (AD) -  normal  EAC (AD) -  normal  TM (AD) -  Normal, no effusion  auricle (AS) -  normal  EAC (AS) -  normal  TM (AS) -  Normal, no effusion  Normal clinical speech reception   Nose:  Ext. inspection -  Normal  Internal Inspection -  Normal mucosa, septum, and turbinates   Nasopharynx:  normal mucosa, no masses   Oral Cavity:  lips -  Normal mucosa, oral competence, and stoma size   Age-appropriate dentition   Papillomatous changes along the right-side maxillary molar and premolar, papillomatous and leukoplakia changes along the right hard palate extending to approximately just short of the midline without extension towards the soft palate; there are areas of mucosa that is abnormal and on palpation is extremely soft but does not appear to have actual bony absence   Tongue - normal movement, no lesions   Oropharynx:  mucosa -  Normal, no lesions  soft palate -  Normal, no lesions, no asymmetry, normal elevation  tonsils -  Normal, no exudates, no abnormal lesions, symmetric   Hypopharynx:  Normal pyriform sinus and pharyngeal wall mucosa   No pooled secretions   Larynx:  Epiglottis, false vocal cord, true vocal cord normal in appearance, bilaterally mobile cords    Neck: No visible mass or asymmetry   Normal palpation, no tenderness, no tracheal deviation   Normal range of motion   Lymphatic:  no abnormal nodes   Cardiovascular:  warm, pink, well-perfused extremities without swelling, tenderness, or edema   Respiratory:  Normal respiratory effort, no stridor   Neuro/Psych.:  mood/affect -  normal  mental status -  normal  cranial nerves -  normal          PROCEDURES:   Flexible fiberoptic laryngoscopy: Scope exam was indicated due to history of palatal cancer. Verbal consent was obtained. The nasal cavity was prepped with an aerosolized solution of topical anesthetic and  vasoconstrictive agent. The scope was passed through the anterior nasal cavity and advanced. Inspection of the nasopharynx revealed no gross abnormality. Inspection of the larynx revealed bilaterally mobile vocal cords. Pyriform sinuses are symmetric. No intra-arytenoid irregularities noted. The epiglottis, aryepiglottic folds, vallecula and base of tongue are unremarkable. The airway is patent. Procedure tolerated well with no immediate complications noted.       RESULTS REVIEWED:   I reviewed the referral records from Dr. Cox which are summarized above    I independently reviewed the PET/CT scan images which showed no evidence of residual disease and no bony erosion of the maxilla        FINAL DIAGNOSIS:   CASE FROM [place], [place] ([id number], OBTAINED 10/26/17):   Hard palate, lesion excision:   - Well differentiated squamous cell carcinoma   - Tumor extends to the lateral margins and base of the lesion   - GMS stain negative for fungal organisms       Evaluation is somewhat limited given lack of IV contrast. Dental  amalgam results in streak artifact.     Regarding evaluation of the mucosal space, there is no abnormal  metabolic uptake on PET CT in the nasopharynx, oropharynx,  hypopharynx, or of the glottis. Regarding the major salivary glands,  no abnormality is identified. Regarding the thyroid gland, no  abnormality is identified.     Right IIa lymph node measures 1.3 x 0.9 cm, maximum SUV 4.65.  Right  axillary lymph node measures 0.6 cm short axis diameter, maximum SUV  2.69.     Multilevel degenerative change of the cervical spine with at least  mild spinal canal narrowing at C6-7. No severe spinal canal or neural  foraminal stenosis. Ossification of the soft tissues posterior to the  spinous processes at C5 and C6. No acute fracture or dislocation.  Regarding the visualized paranasal sinuses, there is no evidence of  significant debris or fluid. Regarding the mastoid air cells, there is  no  evidence of significant debris or fluid. Regarding the major  vasculature in the neck, no abnormality is identified.     Regarding the visualized lung apices, there is no definite  abnormality, and the lung apices appear relatively clear.         Impression:   1. On the fusion PET CT, there is increased metabolic activity and  right IIa lymph node, maximum SUV 4.65. This is indeterminate,  unlikely to represent metastasis given low level FDG uptake and its  normal size.   2. Please refer to the whole body PET CT performed as a separate  report, for the findings of the remainder of the body.      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 Her tumor was incompletely resected with positive margins both deeply and laterally. She comes here for further evaluation and definitive treatment. I discussed with her that given the positive margins a general recommendation would be for re-resection. I explained to her that we need to completely review her case at tumor board but we discussed some possible surgical scenarios. She may require an infrastructure maxillectomy on the right side. I did discuss with her that this would entail removing her molars on the right side. We discussed the options for reconstruction following thsi type of surgery include both a obturator versus a free soft tissue transfer. I explained to her some of the benefits and drawbacks of each of these options. She does have some dexterity issues with her hands secondary to rheumatologic issues. We did briefly discuss that the free flap reconstruction is certainly a larger surgery and much longer with a weeklong hospitalization as well as a prolonged period of NPO status of 2 weeks. We discussed that with the obturator there are risks of leakage with the device and that it requires the ability to place the device inside and outside of the mouth on a regular basis.  Additionally we did discuss that she may require a neck dissection given that this is an oral cavity primary.    Microvascular reconstructive techniques were briefly counseled to the patient. In this procedure, tissue is harvested with its associated blood supply from a distant site of the body, and then transplanted to the wound. The blood supply is then sutured with the aid of a microscope to an artery and vein near the wound so that the tissue can survive in its new environment. The main risk of the surgery is insult to the connected artery and vein, such as by a blood clot, which can then lead to flap death. In the case that a blood clot is detected, the patient will be taken directly to the operating room in an effort to salvage the flap tissue. The patient was counseled that he will be hospitalized for 7 days.     The patient is contemplating having her treatment after the holidays. I did explain to her that if she delays her care as certainly this risks of the tumor growing and resulting in a bigger surgery being required.    We will contact her after we review her case at tumor board.    Thank you very much for the opportunity to participate in the care of your patient.      Cee Alexander M.D.  Otolaryngology- Head & Neck Surgery    CC:  Pradip Cox MD, [facility], [facility] Medical Office [address], [address]  [place]  [address]

## 2017-11-20 NOTE — MR AVS SNAPSHOT
After Visit Summary   2017    Krista Pelayo    MRN: [id number]           Patient Information     Date Of Birth          1940        Visit Information        Provider Department      2017 9:20 AM Cee Alexander MD [facility] Ear Nose and Throat        Today's Diagnoses     Cancer of palate (H)    -  1      Care Instructions    1. We will call you after tumor board on .   2. Please call the ENT clinic with any questions,concerns, new or worsening symptoms.    -Clinic number is [phone]   - Rekha's direct line (Dr. Alexander's nurse) [phone]              Follow-ups after your visit        Who to contact     Please call your clinic at [phone] to:    Ask questions about your health    Make or cancel appointments    Discuss your medicines    Learn about your test results    Speak to your doctor   If you have compliments or concerns about an experience at your clinic, or if you wish to file a complaint, please contact [facility] Patient Relations at [phone] or email us at [email or web address]         Additional Information About Your Visit        MyChart Information     [employer] is an electronic gateway that provides easy, online access to your medical records. With [employer], you can request a clinic appointment, read your test results, renew a prescription or communicate with your care team.     To sign up for [employer] visit the website at [email or web address]   You will be asked to enter the access code listed below, as well as some personal information. Please follow the directions to create your username and password.     Your access code is: [email or web address]  Expires: 2018  5:30 AM     Your access code will  in 90 days. If you need help or a new code, please contact your [facility] Clinic or call [phone] for assistance.        Care EveryWhere ID     This is your Care EveryWhere ID.  "This could be used by other organizations to access your [place] medical records  [id number]        Your Vitals Were     Pulse Height Pulse Oximetry BMI (Body Mass Index)          56 1.55 m (5' 1.02\") 96% 34.55 kg/m2         Blood Pressure from Last 3 Encounters:   11/10/17 120/62    Weight from Last 3 Encounters:   11/20/17 83 kg (183 lb)   11/10/17 81.6 kg (180 lb)              We Performed the Following     IMAGESTREAM RECORDING ORDER        Primary Care Provider Office Phone # Fax #    Too Cannon [phone]       [facility] INTERNAL  [address] [facility] [address]        Equal Access to Services     [facility] Center[facility]HAWN : Hadii susana junior hadjaneyo Soalfred, waaxda luqadaha, qaybta kaalmada adeegyada, graeme lemus . So [facility] [phone].    ATENCIÓN: Si habla español, tiene a rosas disposición servicios gratuitos de asistencia lingüística. Llame al [phone].    We comply with applicable federal civil rights laws and [place] laws. We do not discriminate on the basis of race, color, national origin, age, disability, sex, sexual orientation, or gender identity.            Thank you!     Thank you for choosing [facility] EAR NOSE AND THROAT  for your care. Our goal is always to provide you with excellent care. Hearing back from our patients is one way we can continue to improve our services. Please take a few minutes to complete the written survey that you may receive in the mail after your visit with us. Thank you!             Your Updated Medication List - Protect others around you: Learn how to safely use, store and throw away your medicines at www.disposemymeds.org.          This list is accurate as of: 11/20/17 10:17 AM.  Always use your most recent med list.                   Brand Name Dispense Instructions for use Diagnosis    ACETAMINOPHEN (ANALGESICS OTHER)      Take 650 mg by mouth        aspirin 81 MG EC tablet      Take 81 mg by mouth        augmented betamethasone " dipropionate 0.05 % ointment    DIPROLENE-AF     Apply a thin layer to psoriasis on palms and soles twice daily when flaring. When not flaring, use only Saturday & Sunday.        calcipotriene 0.005 % cream    DOVONOX     Apply twice daily to palms and soles.        ferrous sulfate 325 (65 FE) MG tablet    IRON     Take 325 mg by mouth        lidocaine (viscous) 2 % solution    XYLOCAINE    100 mL    Take 15 mLs by mouth every 8 hours as needed for moderate pain swish and spit every 3-8 hours as needed; max 8 doses/24 hour period        losartan 50 MG tablet    COZAAR     Take 50 mg by mouth        magnesium oxide 400 MG tablet    MAG-OX     Take 400 mg by mouth        metoprolol 50 MG 24 hr tablet    TOPROL-XL     TAKE 1 CAPSULE BY MOUTH DAILY. DO NOT CRUSH OR CHEW        omeprazole 40 MG capsule    priLOSEC    60 capsule    Take 1 capsule (40 mg) by mouth 2 times daily        spironolactone 25 MG tablet    ALDACTONE     Take 25 mg by mouth        tazarotene 0.1 % Crea      Apply to palms and soles 3 times weekly

## 2017-11-21 ENCOUNTER — TELEPHONE (OUTPATIENT)
Dept: GASTROENTEROLOGY | Facility: CLINIC | Age: 77
End: 2017-11-21

## 2017-11-24 PROBLEM — C05.0: Status: ACTIVE | Noted: 2017-11-24

## 2017-11-27 NOTE — TELEPHONE ENCOUNTER
Head & Neck Tumor Conference Note   17    Status: New  Staff: Dr. Alexander    Tumor Site: Hard palate  Tumor Stage: T2N0M0 SCC    Brief History: 77 y.o woman with persistent lesion on hard palate x 8 months after an allergic reaction to diflucan. She was seen by Dr. Cox who performed a biopsy which was benign. She was then taken to the operating room by Dr. Cox who used the radiofrequency wand to resect the palatal lesion which was a 2 cm area of resection. Final pathology was consistent with an invasive squamous cell carcinoma with positive lateral and deep margins  Reason for Review: Review pathology    Imagin/10/17 PET CT fusion examination  1. Neck CT without contrast  2. PET study of the neck  3. PET CT fusion study of the neck     Findings:  Evaluation is somewhat limited given lack of IV contrast. Dental  amalgam results in streak artifact.     Regarding evaluation of the mucosal space, there is no abnormal  metabolic uptake on PET CT in the nasopharynx, oropharynx,  hypopharynx, or of the glottis. Regarding the major salivary glands,  no abnormality is identified. Regarding the thyroid gland, no  abnormality is identified.     Right IIa lymph node measures 1.3 x 0.9 cm, maximum SUV 4.65.  Right  axillary lymph node measures 0.6 cm short axis diameter, maximum SUV  2.69.     Multilevel degenerative change of the cervical spine with at least  mild spinal canal narrowing at C6-7. No severe spinal canal or neural  foraminal stenosis. Ossification of the soft tissues posterior to the  spinous processes at C5 and C6. No acute fracture or dislocation.  Regarding the visualized paranasal sinuses, there is no evidence of  significant debris or fluid. Regarding the mastoid air cells, there is  no evidence of significant debris or fluid. Regarding the major  vasculature in the neck, no abnormality is identified.     Regarding the visualized lung apices, there is no definite  abnormality, and the lung  apices appear relatively clear.         Impression:   1. On the fusion PET CT, there is increased metabolic activity and  right IIa lymph node, maximum SUV 4.65. This is indeterminate,  unlikely to represent metastasis given low level FDG uptake and its  normal size.   2. Please refer to the whole body PET CT performed as a separate  report, for the findings of the remainder of the body    IMPRESSION:   1.  In this patient with history of squamous cell carcinoma of the  hard palate, there is no convincing evidence for metastatic disease in  the chest, abdomen or pelvis.  a. Please refer to dedicated neck PET/CT for full findings in the head  and neck.  2. Bilateral shoulder joint effusions and left knee joint effusion  demonstrate increased metabolic activity, which may represent  superimposed infectious or inflammatory process in the appropriate  clinical context.  3.  Skin thickening an increased metabolic activity in the superficial  soft tissues overlying the right and left calcaneus with additional  increased metabolic activity in the superficial soft tissues of the  feet, which may represent dermatitis.  4. Hypermetabolic rectal uptake.  Although this is usually benign  hemorrhoids, fissure, physiologic excretion, underlying malignancy  remains consideration.   Consider direct visualization and attention  on follow-up PET scans.    4a. Right inguinal lymph nodes with increased metabolic activity  maintain normal morphology and may be reactive. Recommend attention on  follow-up examinations      Pathology:   Received: 11/6/2017   Reported: 11/6/2017 18:33   Ordering Phy(s): JULIANN FELIPE   Additional Phy(s): [facility], Department of Pathology     For improved result formatting, select 'View Enhanced Report Format'   under Linked Documents section.     TEST(S):   15 slides, case #[id number]     FINAL DIAGNOSIS:   CASE FROM [place], [place] ([id number], OBTAINED 10/26/17):   Hard palate, lesion  excision:   - Well differentiated squamous cell carcinoma   - Tumor extends to the lateral margins and base of the lesion   - GMS stain negative for fungal organisms     I have personally reviewed all specimens and/or slides, including the   listed special stains, and used them with my medical judgement to   determine or confirm the final diagnosis    Tumor Board Recommendation:   Plan:   - Wait to obtain pathology from re-resection to decide on further treatment    Nancy Burrell MD PGY-3  Otolaryngology- Head and Neck Surgery

## 2017-12-01 ENCOUNTER — TEAM CONFERENCE (OUTPATIENT)
Dept: OTOLARYNGOLOGY | Facility: CLINIC | Age: 77
End: 2017-12-01

## 2017-12-02 NOTE — TELEPHONE ENCOUNTER
Left message for patient and daughter to discuss tumor board recommendations. Left direct line and encouraged patient and daughter to call back.   Rekha Mcduffie, RN, BSN

## 2017-12-04 DIAGNOSIS — C05.0 MALIGNANT NEOPLASM OF HARD PALATE (H): Primary | ICD-10-CM

## 2017-12-04 NOTE — TELEPHONE ENCOUNTER
Called patient's daughter to review tumor board recommendations. Dr. Alexander would like to proceed with hard palate resection, tooth extractions and Ng tube placement. Daughter and patient agreeable to plan. We will arrange for soonest available but daughter would like to wait until the week of 12/18 as she has to arrange her work schedule. Will work with surgery scheduler to arrange and patient and daughter will be called with potential date and times. They were encouraged to call with further questions or concerns.     Rekha Mcduffie, RN, BSN

## 2017-12-21 ENCOUNTER — CARE COORDINATION (OUTPATIENT)
Dept: OTOLARYNGOLOGY | Facility: CLINIC | Age: 77
End: 2017-12-21

## 2017-12-21 NOTE — PROGRESS NOTES
Telephone Pre-op Teaching    Procedure: Hard palate resection with tooth extractions and NG tube placement  Planned Surgery Date: 12/28/17  Surgeon: Dr. Alexander                      Pre-op folder with specific written instructions mailed to patient for review.    Follow-up call to discuss pre-op instructions/routine and requirements to include:  surgical procedure, post-op recovery and expectations, need for H&P/PAC visit, NPO prior to OR, pre-op antibacterial showers, pain control and importance of follow-up visits. Pre-op nurses will call with more instructions 24-48 hour pre-op.  Ample time was provided for patient questions and in-depth discussion of topics of heightened interest.  Reviewed medication list and provided instructions regarding what medications to stop prior to surgery. Patient verbalized understanding of instructions.  Approximately 15 minutes spent on the telephone with patient discussing and reviewing specific instructions.      Patient was provided contact number for questions or concerns that may arise prior to surgery.        Rekha Mcduffie, RN, BSN

## 2017-12-27 ENCOUNTER — ANESTHESIA EVENT (OUTPATIENT)
Dept: SURGERY | Facility: CLINIC | Age: 77
End: 2017-12-27
Payer: MEDICARE

## 2017-12-28 ENCOUNTER — APPOINTMENT (OUTPATIENT)
Dept: GENERAL RADIOLOGY | Facility: CLINIC | Age: 77
End: 2017-12-28
Attending: STUDENT IN AN ORGANIZED HEALTH CARE EDUCATION/TRAINING PROGRAM
Payer: MEDICARE

## 2017-12-28 ENCOUNTER — HOSPITAL ENCOUNTER (OUTPATIENT)
Facility: CLINIC | Age: 77
Discharge: HOME | End: 2017-12-29
Attending: OTOLARYNGOLOGY | Admitting: OTOLARYNGOLOGY
Payer: MEDICARE

## 2017-12-28 ENCOUNTER — ANESTHESIA (OUTPATIENT)
Dept: SURGERY | Facility: CLINIC | Age: 77
End: 2017-12-28
Payer: MEDICARE

## 2017-12-28 DIAGNOSIS — C05.0 SQUAMOUS CELL CARCINOMA OF HARD PALATE (H): Primary | ICD-10-CM

## 2017-12-28 PROBLEM — C05.9: Status: ACTIVE | Noted: 2017-12-28

## 2017-12-28 LAB
GLUCOSE BLDC GLUCOMTR-MCNC: 92 MG/DL (ref 70–99)
POTASSIUM SERPL-SCNC: 3.6 MMOL/L (ref 3.4–5.3)

## 2017-12-28 PROCEDURE — 88305 TISSUE EXAM BY PATHOLOGIST: CPT | Performed by: OTOLARYNGOLOGY

## 2017-12-28 PROCEDURE — A9270 NON-COVERED ITEM OR SERVICE: HCPCS | Mod: GY | Performed by: STUDENT IN AN ORGANIZED HEALTH CARE EDUCATION/TRAINING PROGRAM

## 2017-12-28 PROCEDURE — 40000985 XR ABDOMEN PORT F1 VW

## 2017-12-28 PROCEDURE — 25000128 H RX IP 250 OP 636: Performed by: NURSE ANESTHETIST, CERTIFIED REGISTERED

## 2017-12-28 PROCEDURE — 36415 COLL VENOUS BLD VENIPUNCTURE: CPT | Performed by: ANESTHESIOLOGY

## 2017-12-28 PROCEDURE — 71000013 ZZH RECOVERY PHASE 1 LEVEL 1 EA ADDTL HR: Performed by: OTOLARYNGOLOGY

## 2017-12-28 PROCEDURE — 25000132 ZZH RX MED GY IP 250 OP 250 PS 637: Mod: GY | Performed by: STUDENT IN AN ORGANIZED HEALTH CARE EDUCATION/TRAINING PROGRAM

## 2017-12-28 PROCEDURE — 40000170 ZZH STATISTIC PRE-PROCEDURE ASSESSMENT II: Performed by: OTOLARYNGOLOGY

## 2017-12-28 PROCEDURE — 36000064 ZZH SURGERY LEVEL 4 EA 15 ADDTL MIN - UMMC: Performed by: OTOLARYNGOLOGY

## 2017-12-28 PROCEDURE — 25000125 ZZHC RX 250: Performed by: NURSE ANESTHETIST, CERTIFIED REGISTERED

## 2017-12-28 PROCEDURE — 84132 ASSAY OF SERUM POTASSIUM: CPT | Performed by: ANESTHESIOLOGY

## 2017-12-28 PROCEDURE — 88331 PATH CONSLTJ SURG 1 BLK 1SPC: CPT | Performed by: OTOLARYNGOLOGY

## 2017-12-28 PROCEDURE — 25000128 H RX IP 250 OP 636: Performed by: ANESTHESIOLOGY

## 2017-12-28 PROCEDURE — 82962 GLUCOSE BLOOD TEST: CPT

## 2017-12-28 PROCEDURE — 25000566 ZZH SEVOFLURANE, EA 15 MIN: Performed by: OTOLARYNGOLOGY

## 2017-12-28 PROCEDURE — C9399 UNCLASSIFIED DRUGS OR BIOLOG: HCPCS | Performed by: NURSE ANESTHETIST, CERTIFIED REGISTERED

## 2017-12-28 PROCEDURE — 25000128 H RX IP 250 OP 636: Performed by: OTOLARYNGOLOGY

## 2017-12-28 PROCEDURE — 27210794 ZZH OR GENERAL SUPPLY STERILE: Performed by: OTOLARYNGOLOGY

## 2017-12-28 PROCEDURE — 71000012 ZZH RECOVERY PHASE 1 LEVEL 1 FIRST HR: Performed by: OTOLARYNGOLOGY

## 2017-12-28 PROCEDURE — 37000008 ZZH ANESTHESIA TECHNICAL FEE, 1ST 30 MIN: Performed by: OTOLARYNGOLOGY

## 2017-12-28 PROCEDURE — 88300 SURGICAL PATH GROSS: CPT | Performed by: OTOLARYNGOLOGY

## 2017-12-28 PROCEDURE — 37000009 ZZH ANESTHESIA TECHNICAL FEE, EACH ADDTL 15 MIN: Performed by: OTOLARYNGOLOGY

## 2017-12-28 PROCEDURE — 88311 DECALCIFY TISSUE: CPT | Performed by: OTOLARYNGOLOGY

## 2017-12-28 PROCEDURE — 36000062 ZZH SURGERY LEVEL 4 1ST 30 MIN - UMMC: Performed by: OTOLARYNGOLOGY

## 2017-12-28 PROCEDURE — 88307 TISSUE EXAM BY PATHOLOGIST: CPT | Performed by: OTOLARYNGOLOGY

## 2017-12-28 RX ORDER — HYDROMORPHONE HYDROCHLORIDE 5 MG/5ML
1 SOLUTION ORAL EVERY 4 HOURS PRN
Status: DISCONTINUED | OUTPATIENT
Start: 2017-12-28 | End: 2017-12-28

## 2017-12-28 RX ORDER — SODIUM CHLORIDE, SODIUM LACTATE, POTASSIUM CHLORIDE, CALCIUM CHLORIDE 600; 310; 30; 20 MG/100ML; MG/100ML; MG/100ML; MG/100ML
INJECTION, SOLUTION INTRAVENOUS CONTINUOUS
Status: DISCONTINUED | OUTPATIENT
Start: 2017-12-28 | End: 2017-12-28 | Stop reason: HOSPADM

## 2017-12-28 RX ORDER — OXYCODONE HYDROCHLORIDE 5 MG/1
5-10 TABLET ORAL
Qty: 40 TABLET | Refills: 0 | Status: SHIPPED | OUTPATIENT
Start: 2017-12-28 | End: 2017-12-28

## 2017-12-28 RX ORDER — METOPROLOL SUCCINATE 25 MG/1
50 TABLET, EXTENDED RELEASE ORAL DAILY
Status: DISCONTINUED | OUTPATIENT
Start: 2017-12-28 | End: 2017-12-29 | Stop reason: HOSPADM

## 2017-12-28 RX ORDER — HYDRALAZINE HYDROCHLORIDE 20 MG/ML
2.5-5 INJECTION INTRAMUSCULAR; INTRAVENOUS EVERY 10 MIN PRN
Status: DISCONTINUED | OUTPATIENT
Start: 2017-12-28 | End: 2017-12-28 | Stop reason: HOSPADM

## 2017-12-28 RX ORDER — ONDANSETRON 4 MG/1
4 TABLET, ORALLY DISINTEGRATING ORAL
Status: DISCONTINUED | OUTPATIENT
Start: 2017-12-28 | End: 2017-12-29 | Stop reason: HOSPADM

## 2017-12-28 RX ORDER — ONDANSETRON 2 MG/ML
4 INJECTION INTRAMUSCULAR; INTRAVENOUS EVERY 6 HOURS PRN
Status: DISCONTINUED | OUTPATIENT
Start: 2017-12-28 | End: 2017-12-29 | Stop reason: HOSPADM

## 2017-12-28 RX ORDER — LABETALOL HYDROCHLORIDE 5 MG/ML
INJECTION, SOLUTION INTRAVENOUS PRN
Status: DISCONTINUED | OUTPATIENT
Start: 2017-12-28 | End: 2017-12-28

## 2017-12-28 RX ORDER — TAZAROTENE 1 MG/G
CREAM TOPICAL
Status: DISCONTINUED | OUTPATIENT
Start: 2017-12-28 | End: 2017-12-28 | Stop reason: CLARIF

## 2017-12-28 RX ORDER — HYDROMORPHONE HYDROCHLORIDE 2 MG/1
2 TABLET ORAL
Qty: 36 TABLET | Refills: 0 | Status: SHIPPED | OUTPATIENT
Start: 2017-12-28 | End: 2017-12-28

## 2017-12-28 RX ORDER — ONDANSETRON 4 MG/1
4 TABLET, ORALLY DISINTEGRATING ORAL EVERY 30 MIN PRN
Status: DISCONTINUED | OUTPATIENT
Start: 2017-12-28 | End: 2017-12-28 | Stop reason: HOSPADM

## 2017-12-28 RX ORDER — LIDOCAINE HYDROCHLORIDE 20 MG/ML
INJECTION, SOLUTION INFILTRATION; PERINEURAL PRN
Status: DISCONTINUED | OUTPATIENT
Start: 2017-12-28 | End: 2017-12-28

## 2017-12-28 RX ORDER — ACETAMINOPHEN 325 MG/1
650 TABLET ORAL EVERY 6 HOURS
Status: DISCONTINUED | OUTPATIENT
Start: 2017-12-28 | End: 2017-12-29

## 2017-12-28 RX ORDER — PROPOFOL 10 MG/ML
INJECTION, EMULSION INTRAVENOUS PRN
Status: DISCONTINUED | OUTPATIENT
Start: 2017-12-28 | End: 2017-12-28

## 2017-12-28 RX ORDER — DEXAMETHASONE SODIUM PHOSPHATE 4 MG/ML
INJECTION, SOLUTION INTRA-ARTICULAR; INTRALESIONAL; INTRAMUSCULAR; INTRAVENOUS; SOFT TISSUE PRN
Status: DISCONTINUED | OUTPATIENT
Start: 2017-12-28 | End: 2017-12-28

## 2017-12-28 RX ORDER — LIDOCAINE 40 MG/G
CREAM TOPICAL
Status: DISCONTINUED | OUTPATIENT
Start: 2017-12-28 | End: 2017-12-28 | Stop reason: HOSPADM

## 2017-12-28 RX ORDER — ONDANSETRON 2 MG/ML
4 INJECTION INTRAMUSCULAR; INTRAVENOUS EVERY 30 MIN PRN
Status: DISCONTINUED | OUTPATIENT
Start: 2017-12-28 | End: 2017-12-28 | Stop reason: HOSPADM

## 2017-12-28 RX ORDER — LOSARTAN POTASSIUM 25 MG/1
50 TABLET ORAL DAILY
Status: DISCONTINUED | OUTPATIENT
Start: 2017-12-28 | End: 2017-12-29

## 2017-12-28 RX ORDER — HYDROMORPHONE HYDROCHLORIDE 2 MG/1
2 TABLET ORAL
Status: DISCONTINUED | OUTPATIENT
Start: 2017-12-28 | End: 2017-12-29

## 2017-12-28 RX ORDER — HYDROMORPHONE HCL/0.9% NACL/PF 0.2MG/0.2
.2-.4 SYRINGE (ML) INTRAVENOUS
Status: DISCONTINUED | OUTPATIENT
Start: 2017-12-28 | End: 2017-12-29 | Stop reason: HOSPADM

## 2017-12-28 RX ORDER — FENTANYL CITRATE 50 UG/ML
INJECTION, SOLUTION INTRAMUSCULAR; INTRAVENOUS PRN
Status: DISCONTINUED | OUTPATIENT
Start: 2017-12-28 | End: 2017-12-28

## 2017-12-28 RX ORDER — GLYCOPYRROLATE 0.2 MG/ML
INJECTION, SOLUTION INTRAMUSCULAR; INTRAVENOUS PRN
Status: DISCONTINUED | OUTPATIENT
Start: 2017-12-28 | End: 2017-12-28

## 2017-12-28 RX ORDER — ONDANSETRON 2 MG/ML
INJECTION INTRAMUSCULAR; INTRAVENOUS PRN
Status: DISCONTINUED | OUTPATIENT
Start: 2017-12-28 | End: 2017-12-28

## 2017-12-28 RX ORDER — OXYCODONE HYDROCHLORIDE 5 MG/1
5-10 TABLET ORAL
Status: DISCONTINUED | OUTPATIENT
Start: 2017-12-28 | End: 2017-12-28

## 2017-12-28 RX ORDER — FERROUS SULFATE 325(65) MG
325 TABLET ORAL DAILY
Status: DISCONTINUED | OUTPATIENT
Start: 2017-12-29 | End: 2017-12-29

## 2017-12-28 RX ORDER — ESMOLOL HYDROCHLORIDE 10 MG/ML
INJECTION INTRAVENOUS PRN
Status: DISCONTINUED | OUTPATIENT
Start: 2017-12-28 | End: 2017-12-28

## 2017-12-28 RX ORDER — FENTANYL CITRATE 50 UG/ML
25-50 INJECTION, SOLUTION INTRAMUSCULAR; INTRAVENOUS
Status: DISCONTINUED | OUTPATIENT
Start: 2017-12-28 | End: 2017-12-28 | Stop reason: HOSPADM

## 2017-12-28 RX ORDER — LABETALOL HYDROCHLORIDE 5 MG/ML
10 INJECTION, SOLUTION INTRAVENOUS
Status: DISCONTINUED | OUTPATIENT
Start: 2017-12-28 | End: 2017-12-28 | Stop reason: HOSPADM

## 2017-12-28 RX ORDER — BETAMETHASONE DIPROPIONATE 0.5 MG/G
OINTMENT, AUGMENTED TOPICAL 2 TIMES DAILY
Status: DISCONTINUED | OUTPATIENT
Start: 2017-12-28 | End: 2017-12-28 | Stop reason: CLARIF

## 2017-12-28 RX ORDER — CALCIPOTRIENE 50 UG/G
CREAM TOPICAL 2 TIMES DAILY
Status: DISCONTINUED | OUTPATIENT
Start: 2017-12-28 | End: 2017-12-28 | Stop reason: CLARIF

## 2017-12-28 RX ORDER — NALOXONE HYDROCHLORIDE 0.4 MG/ML
.1-.4 INJECTION, SOLUTION INTRAMUSCULAR; INTRAVENOUS; SUBCUTANEOUS
Status: ACTIVE | OUTPATIENT
Start: 2017-12-28 | End: 2017-12-29

## 2017-12-28 RX ORDER — SPIRONOLACTONE 25 MG/1
25 TABLET ORAL DAILY
Status: DISCONTINUED | OUTPATIENT
Start: 2017-12-28 | End: 2017-12-29

## 2017-12-28 RX ORDER — HYDROMORPHONE HYDROCHLORIDE 2 MG/1
2 TABLET ORAL
Qty: 48 TABLET | Refills: 0 | Status: SHIPPED | OUTPATIENT
Start: 2017-12-28 | End: 2017-12-29

## 2017-12-28 RX ORDER — AMPICILLIN AND SULBACTAM 2; 1 G/1; G/1
3 INJECTION, POWDER, FOR SOLUTION INTRAMUSCULAR; INTRAVENOUS
Status: COMPLETED | OUTPATIENT
Start: 2017-12-28 | End: 2017-12-28

## 2017-12-28 RX ORDER — AMOXICILLIN 250 MG
1-2 CAPSULE ORAL 2 TIMES DAILY
Qty: 30 TABLET | Refills: 0 | Status: SHIPPED | OUTPATIENT
Start: 2017-12-28 | End: 2017-12-29

## 2017-12-28 RX ORDER — PROPOFOL 10 MG/ML
INJECTION, EMULSION INTRAVENOUS CONTINUOUS PRN
Status: DISCONTINUED | OUTPATIENT
Start: 2017-12-28 | End: 2017-12-28

## 2017-12-28 RX ORDER — CHLORHEXIDINE GLUCONATE ORAL RINSE 1.2 MG/ML
15 SOLUTION DENTAL 3 TIMES DAILY
Status: DISCONTINUED | OUTPATIENT
Start: 2017-12-28 | End: 2017-12-29 | Stop reason: HOSPADM

## 2017-12-28 RX ORDER — MAGNESIUM OXIDE 400 MG/1
400 TABLET ORAL DAILY
Status: DISCONTINUED | OUTPATIENT
Start: 2017-12-29 | End: 2017-12-29 | Stop reason: HOSPADM

## 2017-12-28 RX ORDER — ACETAMINOPHEN 325 MG/1
650 TABLET ORAL EVERY 6 HOURS
Qty: 50 TABLET | Refills: 1 | Status: SHIPPED | OUTPATIENT
Start: 2017-12-28

## 2017-12-28 RX ORDER — EPHEDRINE SULFATE 50 MG/ML
INJECTION, SOLUTION INTRAMUSCULAR; INTRAVENOUS; SUBCUTANEOUS PRN
Status: DISCONTINUED | OUTPATIENT
Start: 2017-12-28 | End: 2017-12-28

## 2017-12-28 RX ADMIN — FENTANYL CITRATE 50 MCG: 50 INJECTION INTRAMUSCULAR; INTRAVENOUS at 11:18

## 2017-12-28 RX ADMIN — ROCURONIUM BROMIDE 10 MG: 10 INJECTION INTRAVENOUS at 08:49

## 2017-12-28 RX ADMIN — Medication 0.2 MG: at 11:34

## 2017-12-28 RX ADMIN — LABETALOL HYDROCHLORIDE 5 MG: 5 INJECTION, SOLUTION INTRAVENOUS at 09:00

## 2017-12-28 RX ADMIN — GLYCOPYRROLATE 0.2 MG: 0.2 INJECTION, SOLUTION INTRAMUSCULAR; INTRAVENOUS at 07:49

## 2017-12-28 RX ADMIN — LABETALOL HYDROCHLORIDE 5 MG: 5 INJECTION, SOLUTION INTRAVENOUS at 09:44

## 2017-12-28 RX ADMIN — HYDROMORPHONE HYDROCHLORIDE 2 MG: 2 TABLET ORAL at 20:20

## 2017-12-28 RX ADMIN — ACETAMINOPHEN 650 MG: 325 TABLET, FILM COATED ORAL at 20:20

## 2017-12-28 RX ADMIN — ROCURONIUM BROMIDE 10 MG: 10 INJECTION INTRAVENOUS at 08:08

## 2017-12-28 RX ADMIN — ESMOLOL HYDROCHLORIDE 10 MG: 10 INJECTION, SOLUTION INTRAVENOUS at 09:16

## 2017-12-28 RX ADMIN — ESMOLOL HYDROCHLORIDE 20 MG: 10 INJECTION, SOLUTION INTRAVENOUS at 08:19

## 2017-12-28 RX ADMIN — Medication 0.2 MG: at 11:45

## 2017-12-28 RX ADMIN — CHLORHEXIDINE GLUCONATE 15 ML: 1.2 RINSE ORAL at 20:25

## 2017-12-28 RX ADMIN — HYDROMORPHONE HYDROCHLORIDE 2 MG: 2 TABLET ORAL at 15:57

## 2017-12-28 RX ADMIN — MIDAZOLAM 1 MG: 1 INJECTION INTRAMUSCULAR; INTRAVENOUS at 07:35

## 2017-12-28 RX ADMIN — ROCURONIUM BROMIDE 40 MG: 10 INJECTION INTRAVENOUS at 07:48

## 2017-12-28 RX ADMIN — ROCURONIUM BROMIDE 5 MG: 10 INJECTION INTRAVENOUS at 09:50

## 2017-12-28 RX ADMIN — DEXAMETHASONE SODIUM PHOSPHATE 10 MG: 4 INJECTION, SOLUTION INTRA-ARTICULAR; INTRALESIONAL; INTRAMUSCULAR; INTRAVENOUS; SOFT TISSUE at 07:50

## 2017-12-28 RX ADMIN — Medication 5 MG: at 07:56

## 2017-12-28 RX ADMIN — FENTANYL CITRATE 25 MCG: 50 INJECTION, SOLUTION INTRAMUSCULAR; INTRAVENOUS at 08:10

## 2017-12-28 RX ADMIN — ESMOLOL HYDROCHLORIDE 10 MG: 10 INJECTION, SOLUTION INTRAVENOUS at 08:41

## 2017-12-28 RX ADMIN — PROPOFOL 10 MG: 10 INJECTION, EMULSION INTRAVENOUS at 08:11

## 2017-12-28 RX ADMIN — FENTANYL CITRATE 25 MCG: 50 INJECTION, SOLUTION INTRAMUSCULAR; INTRAVENOUS at 08:12

## 2017-12-28 RX ADMIN — FENTANYL CITRATE 25 MCG: 50 INJECTION, SOLUTION INTRAMUSCULAR; INTRAVENOUS at 10:21

## 2017-12-28 RX ADMIN — FENTANYL CITRATE 50 MCG: 50 INJECTION, SOLUTION INTRAMUSCULAR; INTRAVENOUS at 07:48

## 2017-12-28 RX ADMIN — LABETALOL HYDROCHLORIDE 5 MG: 5 INJECTION, SOLUTION INTRAVENOUS at 09:10

## 2017-12-28 RX ADMIN — AMOXICILLIN AND CLAVULANATE POTASSIUM 1 TABLET: 875; 125 TABLET, FILM COATED ORAL at 20:20

## 2017-12-28 RX ADMIN — SUGAMMADEX 160 MG: 100 INJECTION, SOLUTION INTRAVENOUS at 10:17

## 2017-12-28 RX ADMIN — PROPOFOL 130 MG: 10 INJECTION, EMULSION INTRAVENOUS at 07:48

## 2017-12-28 RX ADMIN — ONDANSETRON 4 MG: 2 INJECTION INTRAMUSCULAR; INTRAVENOUS at 07:44

## 2017-12-28 RX ADMIN — SODIUM CHLORIDE, POTASSIUM CHLORIDE, SODIUM LACTATE AND CALCIUM CHLORIDE: 600; 310; 30; 20 INJECTION, SOLUTION INTRAVENOUS at 06:36

## 2017-12-28 RX ADMIN — PROPOFOL 20 MCG/KG/MIN: 10 INJECTION, EMULSION INTRAVENOUS at 08:16

## 2017-12-28 RX ADMIN — SODIUM CHLORIDE, POTASSIUM CHLORIDE, SODIUM LACTATE AND CALCIUM CHLORIDE: 600; 310; 30; 20 INJECTION, SOLUTION INTRAVENOUS at 09:54

## 2017-12-28 RX ADMIN — FENTANYL CITRATE 25 MCG: 50 INJECTION, SOLUTION INTRAMUSCULAR; INTRAVENOUS at 08:19

## 2017-12-28 RX ADMIN — FENTANYL CITRATE 50 MCG: 50 INJECTION INTRAMUSCULAR; INTRAVENOUS at 10:54

## 2017-12-28 RX ADMIN — ESMOLOL HYDROCHLORIDE 10 MG: 10 INJECTION, SOLUTION INTRAVENOUS at 08:54

## 2017-12-28 RX ADMIN — FENTANYL CITRATE 25 MCG: 50 INJECTION, SOLUTION INTRAMUSCULAR; INTRAVENOUS at 08:58

## 2017-12-28 RX ADMIN — ONDANSETRON 4 MG: 2 INJECTION INTRAMUSCULAR; INTRAVENOUS at 11:36

## 2017-12-28 RX ADMIN — LIDOCAINE HYDROCHLORIDE 80 MG: 20 INJECTION, SOLUTION INFILTRATION; PERINEURAL at 07:48

## 2017-12-28 RX ADMIN — AMPICILLIN SODIUM AND SULBACTAM SODIUM 3 G: 2; 1 INJECTION, POWDER, FOR SOLUTION INTRAMUSCULAR; INTRAVENOUS at 08:05

## 2017-12-28 ASSESSMENT — PAIN DESCRIPTION - DESCRIPTORS
DESCRIPTORS: THROBBING
DESCRIPTORS: THROBBING

## 2017-12-28 NOTE — OP NOTE
Date of Procedure: 12/28/2017    Attending Physician: Cee Alexander MD    Resident Physicians:   1. Catrachito Willis MD  2. 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    Postoperative Diagnosis: same    Anesthesia: General    Blood loss: 50 cc    Specimens:   ID Type Source Tests Collected by Time Destination   A : left lateral tongue biopsy Tissue Tongue SURGICAL PATHOLOGY EXAM Cee Alexander MD 12/28/2017  8:13 AM     B : teeth # 4 & 5 Other (specify in comments) Other SURGICAL PATHOLOGY EXAM Cee Alexander MD 12/28/2017  8:19 AM     C : right posterior buccal mucosal margin Tissue Mouth SURGICAL PATHOLOGY EXAM Cee Alexander MD 12/28/2017  8:33 AM     D : right anterior buccal mucosal margin Tissue Mouth SURGICAL PATHOLOGY EXAM Cee Alexander MD 12/28/2017  8:35 AM     E : right posterior hard palate Tissue Mouth SURGICAL PATHOLOGY EXAM Cee Alexander MD 12/28/2017  8:38 AM     F : right anterior hard palate margin Tissue Mouth SURGICAL PATHOLOGY EXAM Cee Alexander MD 12/28/2017  8:41 AM     G : tooth # 3  Other (specify in comments) Other SURGICAL PATHOLOGY EXAM Cee Alexander MD 12/28/2017  9:03 AM     H : soft tissue around tooth #3 Tissue Other SURGICAL PATHOLOGY EXAM Cee Alexander MD 12/28/2017  9:21 AM     I : alveolus around tooth #3 Tissue Mouth SURGICAL PATHOLOGY EXAM Cee Alexander MD 12/28/2017  9:24 AM     J : soft tissue between 2 & 3 Tissue Other SURGICAL PATHOLOGY EXAM Cee Alexander MD 12/28/2017  9:31 AM     K : right palatectomy Tissue Mouth SURGICAL PATHOLOGY EXAM Cee Alexander MD 12/28/2017  9:37 AM     L : right posterior soft palate margin Tissue Mouth SURGICAL PATHOLOGY EXAM Cee Alexander MD 12/28/2017  9:39 AM     M : left lateral tongue biopsy Tissue Tongue SURGICAL PATHOLOGY  EXAM Cee Alexander MD 12/28/2017  9:41 AM     N : right alveolus Tissue Tongue SURGICAL PATHOLOGY EXAM Cee Alexander MD 12/28/2017  9:45 AM          Implants:  Xeroform bolster    Complications: None    Findings:  Tumor of right palate abutting teeth #3, 4, 5, measuring about 1.5 cm in diameter  Right palatectomy/alveolar defect measured 5 x 3 cm - covered with xeroform bolster  All mucosal and deep margins negative on intraoperative frozen section  Irregular mucosa of left lateral tongue - biopsy x 2 sent for frozen and permanent section    Indications:  Krista Pelayo is a 77-year-old woman with a right-sided palatal lesion that was biopsied locally consistent with papilloma. She underwent a surgical resection locally that demonstrated invasive squamous cell carcinoma with positive margins. She is therefore indicated for a completion resection.    Description of Procedure:  After informed consent was obtained the patient was brought back to the main operating room and placed in a supine position. General. anesthesia was induced and the patient was orotracheally intubated. The patient was turned 180  away from anesthesia. The arms were tucked. The patient was then prepped and draped in usual fashion. A timeout was performed. The oral cavity retractors were placed and the mouth was inspected. The patient had a new ulcerative-appearing lesion of the left lateral tongue that measured approximately 3 cm in diameter. This was injected with 1:100,000 epinephrine. A 4 mm punch biopsy was used to sample the lesion. This was sent for frozen section and did not show any obvious malignancy or severe dysplasia. A second biopsy of the tongue was taken for permanent section. The biopsy sites were closed with 3-0 vicryl. We then turned our attention to the palate lesion. The patient had a lesion of the hard palate that abutted teeth #3, 4, 5 and extending into the gingivobuccal sulcus. This was injected with  1:100,000 epinephrine. The planned incision was marked with a marking pen taking care to ensure a 1 cm margin circumferentially around the lesion. Given the location of the tumor, the decision was made to extract the teeth in this region. The #9 was used to elevate the mucosa around the teeth and teeth #3, 4, 5 were then removed using the dental extractors. The teeth were sent for gross pathology. Of note there was some abnormal appearing mucosa between teeth which was sent for frozen section. The dental extraction sites were inspected to ensure that there is no residual roots or mucosa. There was a retained root from tooth #3 that was able to be extracted. We then proceeded to make our mucosal cuts with the monopolar cautery circumferentially around around the palate lesion. The incision was extended into the gingivobuccal sulcus on the right side across the alveolus just anterior to tooth #2 and then extending to the hard palate just to the right of midline. Anteriorly the incision extended across the alveolus posterior to tooth #5. Once the mucosal cuts were made, margins were sent circumferentially from around the lesion. These were all negative for carcinoma. There was some abnormal appearing mucosa between tooth #2 and 3 that was also sent for frozen section which was negative. The monopolar cautery was then used to extend the cuts down to the bony hard palate. The Erick was used to release the specimen off the bone towards the alveolus. Laterally the mucosal cuts were extended through the subcutaneous tissue and then traced back towards the lateral border of the alveolus. The specimen was released off of the hard palate and the alveolus. Posteriorly the resection extended to the soft palate and care was taken not to create a through and through defect in the soft palate. Marking sutures were placed on the specimen and this was taken to pathology for orientation. The deep margin was sent from the soft palate  which was negative for carcinoma. The periosteum of the hard palate was taken in continuity and did not appear to be grossly violated by tumor, and the remnant bone was normal appearing. A rongeur and drill were used to take down the edges of the alveolus. The bone fragments were saved and sent for permanent pathology. Hemostasis was achieved with cautery. The wound was irrigated with a liter of saline. A Xeroform bolster was placed over the defect and secured with 2-0 silk sutures within the gingivobuccal sulcus and along the hard palate. An NG tube was placed and secured to the septum with a 2-0 silk suture. The patient was turned back to anesthesia for extubation and taken to the PACU in stable condition. The patient tolerated the procedure well with no immediate complications.    I was present for and participated in the entire procedure.    Cee Alexander MD    Department of Otolaryngology

## 2017-12-28 NOTE — BRIEF OP NOTE
[facility], [place]    Brief Operative Note    Pre-operative diagnosis: Palate Cancer   Post-operative diagnosis Same  Procedure: Procedure(s):  Right Palatectomy, Tongue Biopsy, Dental Extraction and NGT Placement - Wound Class: II-Clean Contaminated  Surgeon: Surgeon(s) and Role:     * Cee Alexander MD - Primary     * Catrachito Willis - Resident - Assisting     * Cal Perez MD - Resident - Assisting  Anesthesia: General   Estimated blood loss: Less than 50 ml  Drains: None  Specimens:   ID Type Source Tests Collected by Time Destination   A : left lateral tongue biopsy Tissue Tongue SURGICAL PATHOLOGY EXAM Cee Alexander MD 12/28/2017  8:13 AM    B : teeth # 4 & 5 Other (specify in comments) Other SURGICAL PATHOLOGY EXAM Cee Alexander MD 12/28/2017  8:19 AM    C : right posterior buccal mucosal margin Tissue Mouth SURGICAL PATHOLOGY EXAM Cee Alexander MD 12/28/2017  8:33 AM    D : right anterior buccal mucosal margin Tissue Mouth SURGICAL PATHOLOGY EXAM Cee Alexander MD 12/28/2017  8:35 AM    E : right posterior hard palate Tissue Mouth SURGICAL PATHOLOGY EXAM Cee Alexander MD 12/28/2017  8:38 AM    F : right anterior hard palate margin Tissue Mouth SURGICAL PATHOLOGY EXAM Cee Alexander MD 12/28/2017  8:41 AM    G : tooth # 3  Other (specify in comments) Other SURGICAL PATHOLOGY EXAM Cee Alexander MD 12/28/2017  9:03 AM    H : soft tissue around tooth #3 Tissue Other SURGICAL PATHOLOGY EXAM Cee Alexander MD 12/28/2017  9:21 AM    I : alveolus around tooth #3 Tissue Mouth SURGICAL PATHOLOGY EXAM Cee Alexander MD 12/28/2017  9:24 AM    J : soft tissue between 2 & 3 Tissue Other SURGICAL PATHOLOGY EXAM Cee Alexander MD 12/28/2017  9:31 AM    K : right palatectomy Tissue Mouth SURGICAL PATHOLOGY EXAM Cee Alexander MD 12/28/2017  9:37 AM    L : right posterior soft palate margin Tissue Mouth SURGICAL PATHOLOGY  EXAM Cee Alexander MD 12/28/2017  9:39 AM    M : left lateral tongue biopsy Tissue Tongue SURGICAL PATHOLOGY EXAM Cee Alexander MD 12/28/2017  9:41 AM    N : right alveolus Tissue Tongue SURGICAL PATHOLOGY EXAM Cee Alexander MD 12/28/2017  9:45 AM      Findings:   Right palatectomy, defect measured 5 x 3 cm. Extraction of tooth #3-5. NG tube placement. .  Complications: None.  Implants: Xeroform bolster placed over surgical defect.

## 2017-12-28 NOTE — IP AVS SNAPSHOT
MRN:[id number]                      After Visit Summary   12/28/2017    Krista Pelayo    MRN: [id number]           Thank you!     Thank you for choosing [facility] for your care. Our goal is always to provide you with excellent care. Hearing back from our patients is one way we can continue to improve our services. Please take a few minutes to complete the written survey that you may receive in the mail after you visit with us. Thank you!        Patient Information     Date Of Birth          1940        Designated Caregiver       Most Recent Value    Caregiver    Will someone help with your care after discharge? yes    Name of designated caregiver Cheng Pelayo (son)    Phone number of caregiver [phone]      About your hospital stay     You were admitted on:  December 28, 2017 You last received care in the:  [facility]    You were discharged on:  December 29, 2017        Reason for your hospital stay       palatectomy                  Who to Call     For medical emergencies, please call 911.  For non-urgent questions about your medical care, please call your primary care provider or clinic, [phone]  For questions related to your surgery, please call your surgery clinic        Attending Provider     Provider Cee Espinoza MD Otolaryngology       Primary Care Provider Office Phone # Fax #    Bryon Davis [phone]      After Care Instructions     Activity       No heavy lifting x 2 weeks            Diet       Clear liquids until 12/30. Soft diet after this            Diet Instructions       Clear liquid diet until 12/30. Can then advance diet to soft diet. Rinse mouth with peridex three times per day. Rinse mouth with water after each meal            Discharge Instructions       Patient to follow up with Dr. Alexander on 1/3 as scheduled            Discharge Instructions - Lifting restrictions       Lifting Restrictions 20  "pounds until seen at Post-op follow up appointment            No Aspirin, Ibuprofen or Naproxen products       for 7 - 10 days following surgery                  Follow-up Appointments     [facility] Follow-up and recommended labs and tests       Dr. Alexander 1/3 as scheduled                  Your next 10 appointments already scheduled     Jan 03, 2018  4:00 PM CST   (Arrive by 3:45 PM)   Return Visit with Cee Alexander MD   [facility] ([facility] [place])    [address] [place] [id number] [phone]              Pending Results     Date and Time Order Name Status Description    12/28/2017 0813 Surgical pathology exam Preliminary             Statement of Approval     Ordered          12/29/17 1103  I have reviewed and agree with all the recommendations and orders detailed in this document.  EFFECTIVE NOW     Approved and electronically signed by:  Vikram Wooten MD             Admission Information     Date & Time Provider Department Dept. Phone    12/28/2017 Cee Alexander MD [facility] [place] [phone]      Your Vitals Were     Blood Pressure Temperature Respirations Height Weight Pulse Oximetry    129/56 (BP Location: Right arm) 97.4  F (36.3  C) (Axillary) 18 1.575 m (5' 2\") 80.9 kg (178 lb 5.6 oz) 97%    BMI (Body Mass Index)                   32.62 kg/m2           [employer] Information     [employer] lets you send messages to your doctor, view your test results, renew your prescriptions, schedule appointments and more. To sign up, go to [email or web address] . Click on \"Log in\" on the left side of the screen, which will take you to the Welcome page. Then click on \"Sign up Now\" on the right side of the page.     You will be asked to enter the access code listed below, as well as some personal information. Please follow the directions to create your username and password.     Your access code is: [email or web address]  Expires: " 2018  5:30 AM     Your access code will  in 90 days. If you need help or a new code, please call your [facility] clinic or [phone].        Care EveryWhere ID     This is your Care EveryWhere ID. This could be used by other organizations to access your [facility] medical records  [id number]        Equal Access to Services     KARENCARLITO ELBERT : Hadii susana ku hadasho Soomaali, waaxda luqadaha, qaybta kaalmada adebashiryada, graeme blankmalikmary lemus . So [facility] [phone].    ATENCIÓN: Si habla español, tiene a rosas disposición servicios gratuitos de asistencia lingüística. Tashi al [phone].    We comply with applicable federal civil rights laws and [place] laws. We do not discriminate on the basis of race, color, national origin, age, disability, sex, sexual orientation, or gender identity.               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 If we are uncertain of the size of tablets/capsules you have at home, strength may be listed as something that might have changed.        Dose / Directions    * ACETAMINOPHEN (ANALGESICS OTHER)   This may  have changed:  Another medication with the same name was added. Make sure you understand how and when to take each.        Dose:  650 mg   Take 650 mg by mouth   Refills:  0       * acetaminophen 325 MG tablet   Commonly known as:  TYLENOL   This may have changed:  You were already taking a medication with the same name, and this prescription was added. Make sure you understand how and when to take each.   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 Make sure you understand how and when to take each.   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 Read the directions carefully, and ask your doctor or other care provider to review them with you.      CONTINUE these medicines which have NOT CHANGED        Dose / Directions    augmented betamethasone dipropionate 0.05 % ointment   Commonly known as:  DIPROLENE-AF        Apply a thin layer to psoriasis on palms and soles twice daily when flaring. When not flaring, use only Saturday & Sunday.   Refills:  0       calcipotriene 0.005 % cream   Commonly known as:  DOVONOX        Apply twice daily to palms and soles.   Refills:  0       ferrous sulfate 325 (65 FE) MG tablet   Commonly known as:  IRON        Dose:  325 mg   Take 325 mg by mouth   Refills:  0       lidocaine (viscous) 2 % solution   Commonly known as:  XYLOCAINE        Dose:  15 mL   Take 15 mLs by mouth every 8 hours as needed for moderate pain swish and spit every 3-8 hours as needed; max 8  doses/24 hour period   Quantity:  100 mL   Refills:  0       losartan 50 MG tablet   Commonly known as:  COZAAR        Dose:  50 mg   Take 50 mg by mouth   Refills:  0       magnesium oxide 400 MG tablet   Commonly known as:  MAG-OX        Dose:  400 mg   Take 400 mg by mouth   Refills:  0       metoprolol 50 MG 24 hr tablet   Commonly known as:  TOPROL-XL        TAKE 1 CAPSULE BY MOUTH DAILY. DO NOT CRUSH OR CHEW   Refills:  0       omeprazole 40 MG capsule   Commonly known as:  priLOSEC        Dose:  40 mg   Take 1 capsule (40 mg) by mouth 2 times daily   Quantity:  60 capsule   Refills:  0       spironolactone 25 MG tablet   Commonly known as:  ALDACTONE        Dose:  25 mg   Take 25 mg by mouth   Refills:  0       tazarotene 0.1 % Crea        Apply to palms and soles 3 times weekly   Refills:  0         STOP taking     aspirin 81 MG EC tablet                Where to get your medicines      These medications were sent to [facility] [place], [place] - [address] [place] [address]  [address] [facility] [address]     Phone:  [phone]     acetaminophen 32 mg/mL solution    amoxicillin-clavulanate 400-57 MG/5ML suspension    chlorhexidine 0.12 % solution    Docusate Sodium 150 MG/15ML Liqd         Some of these will need a paper prescription and others can be bought over the counter. Ask your nurse if you have questions.     Bring a paper prescription for each of these medications     acetaminophen 325 MG tablet               ANTIBIOTIC INSTRUCTION     You've Been Prescribed an Antibiotic - Now What?  Your healthcare team thinks that you or your loved one might have an infection. Some infections can be treated with antibiotics, which are powerful, life-saving drugs. Like all medications, antibiotics have side effects and should only be used when necessary. There are some important things you should know about your antibiotic treatment.      Your healthcare team may run tests before you start  taking an antibiotic.    Your team may take samples (e.g., from your blood, urine or other areas) to run tests to look for bacteria. These test can be important to determine if you need an antibiotic at all and, if you do, which antibiotic will work best.      Within a few days, your healthcare team might change or even stop your antibiotic.    Your team may start you on an antibiotic while they are working to find out what is making you sick.    Your team might change your antibiotic because test results show that a different antibiotic would be better to treat your infection.    In some cases, once your team has more information, they learn that you do not need an antibiotic at all. They may find out that you don't have an infection, or that the antibiotic you're taking won't work against your infection. For example, an infection caused by a virus can't be treated with antibiotics. Staying on an antibiotic when you don't need it is more likely to be harmful than helpful.      You may experience side effects from your antibiotic.    Like all medications, antibiotics have side effects. Some of these can be serious.    Let you healthcare team know if you have any known allergies when you are admitted to the hospital.    One significant side effect of nearly all antibiotics is the risk of severe and sometimes deadly diarrhea caused by Clostridium difficile (C. Difficile). This occurs when a person takes antibiotics because some good germs are destroyed. Antibiotic use allows C. diificile to take over, putting patients at high risk for this serious infection.    As a patient or caregiver, it is important to understand your or your loved one's antibiotic treatment. It is especially important for caregivers to speak up when patients can't speak for themselves. Here are some important questions to ask your healthcare team.    What infection is this antibiotic treating and how do you know I have that infection?    What  side effects might occur from this antibiotic?    How long will I need to take this antibiotic?    Is it safe to take this antibiotic with other medications or supplements (e.g., vitamins) that I am taking?     Are there any special directions I need to know about taking this antibiotic? For example, should I take it with food?    How will I be monitored to know whether my infection is responding to the antibiotic?    What tests may help to make sure the right antibiotic is prescribed for me?      Information provided by:  www.cdc.gov/getsmart  U.S. Department of Health and Human Services  Centers for disease Control and Prevention  National Center for Emerging and Zoonotic Infectious Diseases  Division of Healthcare Quality Promotion         Protect others around you: Learn how to safely use, store and throw away your medicines at www.disposemymeds.org.             Medication List: This is a list of all your medications and when to take them. Check marks below indicate your daily home schedule. Keep this list as a reference.      Medications           Morning Afternoon Evening Bedtime As Needed    * ACETAMINOPHEN (ANALGESICS OTHER)   Take 650 mg by mouth   Last time this was given:  650 mg on 12/29/2017  7:05 AM                                * acetaminophen 325 MG tablet   Commonly known as:  TYLENOL   Take 2 tablets (650 mg) by mouth every 6 hours   Last time this was given:  650 mg on 12/28/2017  8:20 PM                                * acetaminophen 32 mg/mL solution   Commonly known as:  TYLENOL   Take 20.3 mLs (650 mg) by mouth every 6 hours   Last time this was given:  650 mg on 12/29/2017  7:05 AM                                amoxicillin-clavulanate 400-57 MG/5ML suspension   Commonly known as:  AUGMENTIN   Take 10.9 mLs (875 mg) by mouth 2 times daily for 7 days   Last time this was given:  875 mg on 12/29/2017  8:40 AM                                augmented betamethasone dipropionate 0.05 % ointment    Commonly known as:  DIPROLENE-AF   Apply a thin layer to psoriasis on palms and soles twice daily when flaring. When not flaring, use only Saturday & Sunday.                                calcipotriene 0.005 % cream   Commonly known as:  DOVONOX   Apply twice daily to palms and soles.                                chlorhexidine 0.12 % solution   Commonly known as:  PERIDEX   Swish and spit 15 mLs in mouth 3 times daily for 7 days   Last time this was given:  15 mLs on 12/29/2017  8:49 AM                                Docusate Sodium 150 MG/15ML Liqd   Take 100 mg by mouth 2 times daily as needed                                ferrous sulfate 325 (65 FE) MG tablet   Commonly known as:  IRON   Take 325 mg by mouth                                lidocaine (viscous) 2 % solution   Commonly known as:  XYLOCAINE   Take 15 mLs by mouth every 8 hours as needed for moderate pain swish and spit every 3-8 hours as needed; max 8 doses/24 hour period                                losartan 50 MG tablet   Commonly known as:  COZAAR   Take 50 mg by mouth                                magnesium oxide 400 MG tablet   Commonly known as:  MAG-OX   Take 400 mg by mouth                                metoprolol 50 MG 24 hr tablet   Commonly known as:  TOPROL-XL   TAKE 1 CAPSULE BY MOUTH DAILY. DO NOT CRUSH OR CHEW   Last time this was given:  50 mg on 12/29/2017  8:47 AM                                omeprazole 40 MG capsule   Commonly known as:  priLOSEC   Take 1 capsule (40 mg) by mouth 2 times daily   Last time this was given:  40 mg on 12/29/2017  8:44 AM                                spironolactone 25 MG tablet   Commonly known as:  ALDACTONE   Take 25 mg by mouth                                tazarotene 0.1 % Crea   Apply to palms and soles 3 times weekly                                * Notice:  This list has 3 medication(s) that are the same as other medications prescribed for you. Read the directions carefully, and ask  your doctor or other care provider to review them with you.

## 2017-12-28 NOTE — ANESTHESIA PREPROCEDURE EVALUATION
Anesthesia Evaluation     . Pt has had prior anesthetic.     History of anesthetic complications   - PONV        ROS/MED HX    ENT/Pulmonary: Comment: SCC of hard palate      Neurologic:  - neg neurologic ROS     Cardiovascular:     (+) hypertension--CAD, --stent,2010  1 Drug Eluting Stent .. Taking blood thinners : Instructions Given to patient: stopped aspirin. . . :. . 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      Musculoskeletal:         GI/Hepatic:  - neg GI/hepatic ROS       Renal/Genitourinary: Comment: Has only one kidney - ROS Renal section negative       Endo:  - neg endo ROS       Psychiatric:  - neg psychiatric ROS       Infectious Disease:         Malignancy:   (+) Malignancy History of Skin          Other:    (+) No chance of pregnancy                  ANESTHESIA PREOP EVALUATION    Procedure: Procedure(s):  Right Wide Local Excision Palate, Nasogastric Tube, Possible Tooth Extraction  *Latex Allergy* - Wound Class: II-Clean Contaminated   - Wound Class: II-Clean Contaminated    HPI: Krista Pelayo is a 77 year old female with 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     PMHx/PSHx/ROS:  Past Medical History:   Diagnosis Date     Cancer (H)      Hypertension      PONV (postoperative nausea and vomiting)        Past Surgical History:   Procedure Laterality Date     ESOPHAGOSCOPY, GASTROSCOPY, DUODENOSCOPY (EGD), COMBINED N/A 11/10/2017    Procedure: COMBINED ESOPHAGOSCOPY, GASTROSCOPY, DUODENOSCOPY (EGD);;  Surgeon: Matt Zarate MD;  Location:  GI     GENITOURINARY SURGERY      KIDNEY, STENT     ORTHOPEDIC SURGERY      ltk       Past Anes Hx: No personal or  family h/o anesthesia problems    Soc Hx:   Social History   Substance Use Topics     Smoking status: Former Smoker     Smokeless tobacco: Never Used      Comment: only smoked briefly as a teenager      Alcohol use Yes      Comment: 2x a month       Allergies:   Allergies   Allergen Reactions     Atorvastatin      Myalgias     Codeine Nausea and Vomiting     chills     Fluconazole      Other reaction(s): Other  Lip swelling associated with sores      Hydrocodone-Acetaminophen Nausea and Vomiting     Meperidine Nausea and Vomiting     Pravastatin      Other reaction(s): Muscle weakness     Propoxyphene N-Apap Nausea and Vomiting     Ultram [Tramadol] Swelling     numbness face, arms, difficulity with speech     Adhesive Tape Rash     Latex Rash     Morphine Rash     No Clinical Screening - See Comments Rash and Swelling     ADHESIVE TAPE, NARCOTICS  red streaks on arms  TREES     Sulfa Drugs Rash       Meds:   Prescriptions Prior to Admission   Medication Sig Dispense Refill Last Dose     ACETAMINOPHEN, ANALGESICS OTHER, Take 650 mg by mouth   12/27/2017 at 2200     aspirin 81 MG EC tablet Take 81 mg by mouth   12/20/2017 at Unknown time     augmented betamethasone dipropionate (DIPROLENE-AF) 0.05 % ointment Apply a thin layer to psoriasis on palms and soles twice daily when flaring. When not flaring, use only Saturday & Sunday.   12/28/2017 at 0500     calcipotriene (DOVONOX) 0.005 % cream Apply twice daily to palms and soles.   12/28/2017 at 0500     ferrous sulfate (IRON) 325 (65 FE) MG tablet Take 325 mg by mouth   12/27/2017 at 0700     losartan (COZAAR) 50 MG tablet Take 50 mg by mouth   12/27/2017 at 0700     magnesium oxide (MAG-OX) 400 MG tablet Take 400 mg by mouth   12/27/2017 at 0700     metoprolol (TOPROL-XL) 50 MG 24 hr tablet TAKE 1 CAPSULE BY MOUTH DAILY. DO NOT CRUSH OR CHEW   12/28/2017 at 0500     spironolactone (ALDACTONE) 25 MG tablet Take 25 mg by mouth   12/27/2017 at 0700     tazarotene 0.1 %  CREA Apply to palms and soles 3 times weekly   Past Week at 2200     omeprazole (PRILOSEC) 40 MG capsule Take 1 capsule (40 mg) by mouth 2 times daily 60 capsule 0 12/28/2017 at 0500     lidocaine, viscous, (XYLOCAINE) 2 % solution Take 15 mLs by mouth every 8 hours as needed for moderate pain swish and spit every 3-8 hours as needed; max 8 doses/24 hour period 100 mL 0 More than a month at Unknown time       No current outpatient prescriptions on file.       Physical Exam:  VS: T 97.8, P Data Unavailable, /71, R 16, SpO2 99%, Weight   Wt Readings from Last 2 Encounters:   12/28/17 80.9 kg (178 lb 5.6 oz)   11/20/17 83 kg (183 lb)         Labs:  UPT: No results found for: HCGQUANT      BMP:  No results for input(s): NA, POTASSIUM, CHLORIDE, CO2, BUN, CR, GLC, VISHAL in the last 88979 hours.  CBC:   No results for input(s): WBC, RBC, HGB, HCT, MCV, MCH, MCHC, RDW, PLT in the last 90843 hours.  Coags:  No results for input(s): INR, PTT, FIBR in the last 96130 hours.    Assessment/Plan:  - ASA 3  - GETA with standard ASA monitors, IV induction, balanced anesthetic  - Will see if ENT needs nasal ETT  - PIV  - Antibiotics per surgery  - PONV prophylaxis  - Relevant risks, benefits, alternatives and the anesthetic plan were discussed with patient/family or family representative.  All questions were answered and there was agreement to proceed.      Julienne Gillis MD    12/28/2017  6:47 AM      Physical Exam  Normal systems: pulmonary and dental    Airway   Mallampati: II  TM distance: >3 FB  Neck ROM: full    Dental     Cardiovascular   Rhythm and rate: regular and normal  (-) no murmur    Pulmonary    breath sounds clear to auscultation                    Anesthesia Plan      History & Physical Review  History and physical reviewed and following examination; no interval change.    ASA Status:  3 .        Plan for General and ETT with Intravenous induction. Maintenance will be Balanced.    PONV prophylaxis:   Ondansetron (or other 5HT-3), Dexamethasone or Solumedrol and Other (See comment)  Additional equipment: 2nd IV      Postoperative Care  Postoperative pain management:  Multi-modal analgesia and IV analgesics.      Consents  Anesthetic plan, risks, benefits and alternatives discussed with:  Patient..                          .

## 2017-12-28 NOTE — ANESTHESIA POSTPROCEDURE EVALUATION
Patient: Krista Pelayo    Procedure(s):  Right Palatectomy, Tongue Biopsy, Dental Extraction and NGT Placement - Wound Class: II-Clean Contaminated    Diagnosis:Palate Cancer   Diagnosis Additional Information: No value filed.    Anesthesia Type:  General, ETT    Note:  Anesthesia Post Evaluation    Patient location during evaluation: PACU  Patient participation: Able to fully participate in evaluation  Level of consciousness: awake and alert  Pain management: adequate  Airway patency: patent  Cardiovascular status: acceptable  Respiratory status: acceptable and spontaneous ventilation  Hydration status: euvolemic  PONV: none     Anesthetic complications: None          Last vitals:  Vitals:    12/28/17 1130 12/28/17 1145 12/28/17 1200   BP: 126/58 123/64 119/59   Resp: 16 16 16   Temp:   36.7  C (98  F)   SpO2: 98% 98% 98%         Electronically Signed By: Julienne Gillis MD  December 28, 2017  12:11 PM

## 2017-12-28 NOTE — IP AVS SNAPSHOT
[address] Observation [address] [address]    Phone:  [phone]    Fax:  [phone]                                       After Visit Summary   12/28/2017    Krista Pelayo    MRN: [id number]           After Visit Summary Signature Page     I have received my discharge instructions, and my questions have been answered. I have discussed any challenges I see with this plan with the nurse or doctor.    ..........................................................................................................................................  Patient/Patient Representative Signature      ..........................................................................................................................................  Patient Representative Print Name and Relationship to Patient    ..................................................               ................................................  Date                                            Time    ..........................................................................................................................................  Reviewed by Signature/Title    ...................................................              ..............................................  Date                                                            Time

## 2017-12-28 NOTE — OR NURSING
"Writer notified Dr. Willis that pt's abdominal x ray was completed in PACU. Dr. Willis stated, \"Ok, I'll take a look at it.\"   "

## 2017-12-28 NOTE — ANESTHESIA CARE TRANSFER NOTE
Patient: Krista Pelayo    Procedure(s):  Right Palatectomy, Tongue Biopsy, Dental Extraction and NGT Placement - Wound Class: II-Clean Contaminated    Diagnosis: Palate Cancer   Diagnosis Additional Information: No value filed.    Anesthesia Type:   General, ETT     Note:  Airway :Face Mask  Patient transferred to:PACU  Comments: Patient transferred to PACU spontaneously breathing.  VSS.  Report to RN. Handoff Report: Identifed the Patient, Identified the Reponsible Provider, Reviewed the pertinent medical history, Discussed the surgical course, Reviewed Intra-OP anesthesia mangement and issues during anesthesia, Set expectations for post-procedure period and Allowed opportunity for questions and acknowledgement of understanding      Vitals: (Last set prior to Anesthesia Care Transfer)    CRNA VITALS  12/28/2017 1000 - 12/28/2017 1033      12/28/2017             SpO2: 100 %                Electronically Signed By: MYLES Chaney CRNA  December 28, 2017  10:33 AM

## 2017-12-28 NOTE — OR NURSING
Writer talked with Dr. Gillis, she stated she will come and see pt in PACU, and will then place sign out.

## 2017-12-29 VITALS
BODY MASS INDEX: 32.82 KG/M2 | HEIGHT: 62 IN | OXYGEN SATURATION: 97 % | RESPIRATION RATE: 18 BRPM | TEMPERATURE: 97.4 F | WEIGHT: 178.35 LBS | SYSTOLIC BLOOD PRESSURE: 129 MMHG | DIASTOLIC BLOOD PRESSURE: 56 MMHG

## 2017-12-29 DIAGNOSIS — Z98.890 PONV (POSTOPERATIVE NAUSEA AND VOMITING): Primary | ICD-10-CM

## 2017-12-29 DIAGNOSIS — R11.2 PONV (POSTOPERATIVE NAUSEA AND VOMITING): Primary | ICD-10-CM

## 2017-12-29 LAB
CREAT SERPL-MCNC: 1.06 MG/DL (ref 0.52–1.04)
GFR SERPL CREATININE-BSD FRML MDRD: 50 ML/MIN/1.7M2
PLATELET # BLD AUTO: 208 10E9/L (ref 150–450)

## 2017-12-29 PROCEDURE — 25000132 ZZH RX MED GY IP 250 OP 250 PS 637: Mod: GY | Performed by: STUDENT IN AN ORGANIZED HEALTH CARE EDUCATION/TRAINING PROGRAM

## 2017-12-29 PROCEDURE — 36415 COLL VENOUS BLD VENIPUNCTURE: CPT | Performed by: OTOLARYNGOLOGY

## 2017-12-29 PROCEDURE — 25000128 H RX IP 250 OP 636: Performed by: STUDENT IN AN ORGANIZED HEALTH CARE EDUCATION/TRAINING PROGRAM

## 2017-12-29 PROCEDURE — 82565 ASSAY OF CREATININE: CPT | Performed by: OTOLARYNGOLOGY

## 2017-12-29 PROCEDURE — 25000132 ZZH RX MED GY IP 250 OP 250 PS 637: Mod: GY | Performed by: OTOLARYNGOLOGY

## 2017-12-29 PROCEDURE — A9270 NON-COVERED ITEM OR SERVICE: HCPCS | Mod: GY | Performed by: STUDENT IN AN ORGANIZED HEALTH CARE EDUCATION/TRAINING PROGRAM

## 2017-12-29 PROCEDURE — 85049 AUTOMATED PLATELET COUNT: CPT | Performed by: OTOLARYNGOLOGY

## 2017-12-29 PROCEDURE — A9270 NON-COVERED ITEM OR SERVICE: HCPCS | Mod: GY | Performed by: OTOLARYNGOLOGY

## 2017-12-29 RX ORDER — HYDROMORPHONE HYDROCHLORIDE 5 MG/5ML
2 SOLUTION ORAL
Status: DISCONTINUED | OUTPATIENT
Start: 2017-12-29 | End: 2017-12-29 | Stop reason: HOSPADM

## 2017-12-29 RX ORDER — AMOXICILLIN AND CLAVULANATE POTASSIUM 400; 57 MG/5ML; MG/5ML
875 POWDER, FOR SUSPENSION ORAL 2 TIMES DAILY
Qty: 152.6 ML | Refills: 0 | Status: SHIPPED | OUTPATIENT
Start: 2017-12-29 | End: 2018-01-05

## 2017-12-29 RX ORDER — AMOXICILLIN AND CLAVULANATE POTASSIUM 400; 57 MG/5ML; MG/5ML
875 POWDER, FOR SUSPENSION ORAL EVERY 12 HOURS SCHEDULED
Status: DISCONTINUED | OUTPATIENT
Start: 2017-12-29 | End: 2017-12-29 | Stop reason: HOSPADM

## 2017-12-29 RX ORDER — ONDANSETRON 4 MG/1
4-8 TABLET, ORALLY DISINTEGRATING ORAL
Qty: 30 TABLET | Refills: 1 | Status: SHIPPED | OUTPATIENT
Start: 2017-12-29 | End: 2018-03-23

## 2017-12-29 RX ORDER — CHLORHEXIDINE GLUCONATE ORAL RINSE 1.2 MG/ML
15 SOLUTION DENTAL 3 TIMES DAILY
Qty: 315 ML | Refills: 0 | Status: SHIPPED | OUTPATIENT
Start: 2017-12-29 | End: 2018-01-05

## 2017-12-29 RX ORDER — DOCUSATE SODIUM 50 MG/5ML
100 LIQUID ORAL 2 TIMES DAILY PRN
Qty: 150 ML | Refills: 1 | Status: SHIPPED | OUTPATIENT
Start: 2017-12-29 | End: 2018-03-23

## 2017-12-29 RX ADMIN — SPIRONOLACTONE 25 MG: 100 TABLET, FILM COATED ORAL at 08:39

## 2017-12-29 RX ADMIN — LOSARTAN POTASSIUM 50 MG: 100 TABLET, FILM COATED ORAL at 08:41

## 2017-12-29 RX ADMIN — OMEPRAZOLE 40 MG: 20 CAPSULE, DELAYED RELEASE ORAL at 08:44

## 2017-12-29 RX ADMIN — Medication 0.2 MG: at 01:27

## 2017-12-29 RX ADMIN — AMOXICILLIN AND CLAVULANATE POTASSIUM 875 MG: 400; 57 POWDER, FOR SUSPENSION ORAL at 08:40

## 2017-12-29 RX ADMIN — ENOXAPARIN SODIUM 40 MG: 40 INJECTION SUBCUTANEOUS at 08:56

## 2017-12-29 RX ADMIN — CHLORHEXIDINE GLUCONATE 15 ML: 1.2 RINSE ORAL at 08:49

## 2017-12-29 RX ADMIN — ACETAMINOPHEN 650 MG: 160 SUSPENSION ORAL at 07:05

## 2017-12-29 RX ADMIN — HYDROMORPHONE HYDROCHLORIDE 2 MG: 1 SOLUTION ORAL at 11:39

## 2017-12-29 RX ADMIN — METOPROLOL SUCCINATE 50 MG: 25 TABLET, EXTENDED RELEASE ORAL at 08:47

## 2017-12-29 ASSESSMENT — PAIN DESCRIPTION - DESCRIPTORS
DESCRIPTORS: ACHING
DESCRIPTORS: SORE

## 2017-12-29 NOTE — PROGRESS NOTES
Pt has tolerated clear liquids. Urinating without difficulty. Ambulates with steady gait. Pain managed well with tylenol today. VSS. Dr Wooten d/c'd NG tube this morning.  Daughter at bedside. Plan is for discharge.

## 2017-12-29 NOTE — DISCHARGE SUMMARY
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 She had a lesion on her palate initially noticed ~9 months ago by her dentist. An initial biopsy of the lesion was benign and she was taken to the operating room by Dr. Cox for radiofrequency wand to resect the palatal lesion. (2 cm area of resection) Final pathology from this resection was consistent with an invasive squamous cell carcinoma with positive lateral and deep margins. She says she did not have any issues with the surgery, but wished for further cares to control the cancer in his mouth. Dr Alexander and the patient had a lengthy discussion of the possible treatments, and a tumor board discussion was had. Recommendations were for reexcision of the lesions and this was scheduled for 12/28/2017. The patient was in agreement with the plan and understood the risks and benefits of procedure.    Hospital Course: The patient was admitted to the hospital and underwent the above mentioned procedure. She tolerated the procedure without any intra- or clementine-operative complications. Please see the operative report for full details of the procedure. The patient was admitted for post-operative monitoring. Her postoperative course was uneventful. Although a NG feeding tube was placed during the procedure, the patient tolerated liquids well throughout her hospitalization. This was removed before discharge. At discharge, the patient's  pain was well controlled, the patient was voiding on her own, was ambulating and tolerating oral liquids.    Discharge Exam:  Vitals:    12/28/17 2234 12/28/17 2357 12/29/17 0650 12/29/17 0700   BP: 111/53 117/59 129/56 129/56   BP Location: Right arm Right arm Right arm Right arm   Resp: 20 18 16 18   Temp: 98  F (36.7  C) 97.9  F (36.6  C) 98  F (36.7  C) 97.4  F (36.3  C)   TempSrc: Oral Oral Oral Axillary   SpO2: 96%  98% 97%   Weight:       Height:           General: A&O x 3, No acute distress  HEENT: PERRL, EOMI without spontaneous or gaze evoked nystagmus. Palatal lesion excision with well adhered bolster dressing with intact sutures. The area is clean and dry. No other oral lesions noticed. Surgically removed teeth near surgical field with no sign of infection or swelling.   Respiratory: Breathing non-labored on room air, no stridor, no accessory muscle use.     Discharge Medications:   Krista Pelayo   Home Medication Instructions JOSUÉ:[id number]    Printed on:12/29/17 0728   Medication Information                      acetaminophen (TYLENOL) 32 mg/mL solution  Take 20.3 mLs (650 mg) by mouth every 6 hours             Hydromorphone 2mg Q3 PRN                      amoxicillin-clavulanate (AUGMENTIN) 400-57 MG/5ML suspension  Take 10.9 mLs (875 mg) by mouth 2 times daily for 7 days             augmented betamethasone dipropionate (DIPROLENE-AF) 0.05 % ointment  Apply a thin layer to psoriasis on palms and soles twice daily when flaring. When not flaring, use only Saturday & Sunday.             calcipotriene (DOVONOX) 0.005 % cream  Apply twice daily to palms and soles.             chlorhexidine (PERIDEX) 0.12 % solution  Swish and spit 15 mLs in mouth 3 times daily for 7 days             Docusate Sodium 150 MG/15ML LIQD  Take 100 mg by mouth 2 times daily as needed             ferrous sulfate (IRON) 325 (65 FE) MG tablet  Take 325 mg by mouth             lidocaine, viscous, (XYLOCAINE) 2 %  solution  Take 15 mLs by mouth every 8 hours as needed for moderate pain swish and spit every 3-8 hours as needed; max 8 doses/24 hour period             losartan (COZAAR) 50 MG tablet  Take 50 mg by mouth             magnesium oxide (MAG-OX) 400 MG tablet  Take 400 mg by mouth             metoprolol (TOPROL-XL) 50 MG 24 hr tablet  TAKE 1 CAPSULE BY MOUTH DAILY. DO NOT CRUSH OR CHEW             omeprazole (PRILOSEC) 40 MG capsule  Take 1 capsule (40 mg) by mouth 2 times daily             spironolactone (ALDACTONE) 25 MG tablet  Take 25 mg by mouth             tazarotene 0.1 % CREA  Apply to palms and soles 3 times weekly                   Discharge Procedure Orders  Discharge Instructions - Lifting restrictions   Order Comments: Lifting Restrictions 20 pounds until seen at Post-op follow up appointment     Discharge Instructions   Order Comments: Patient to follow up with Dr. Alexander on 1/3 as scheduled     No Aspirin, Ibuprofen or Naproxen products   Order Comments: for 7 - 10 days following surgery     Diet Instructions   Order Comments: Clear liquid diet until 12/30. Can then advance diet to soft diet. Rinse mouth with peridex three times per day. Rinse mouth with water after each meal     Reason for your hospital stay   Order Comments: palatectomy     Adult [facility] Follow-up and recommended labs and tests   Order Comments: Dr. Alexander 1/3 as scheduled     Activity   Order Comments: No heavy lifting x 2 weeks   Order Specific Question Answer Comments   Is discharge order? Yes      Full Code     Diet   Order Comments: Clear liquids until 12/30. Soft diet after this   Order Specific Question Answer Comments   Is discharge order? Yes          Dispo: To home in good condition. All of the patient's questions/concerns have been addressed at this time. She understands the follow up plans.    Vikram Wooten, PGY1  Otolaryngology-Head & Neck Surgery  Please contact ENT by dialing [phone] and entering job code  [phone].      Teaching statement:  I saw patient prior to discharge. Instructed on oral care. Clear liquid diet then transition to full liquids until follow-up next week.     Cee Alexander MD    Department of Otolaryngology

## 2017-12-29 NOTE — PROGRESS NOTES
"/59 (BP Location: Right arm)  Temp 98.5  F (36.9  C) (Oral)  Resp 19  Ht 1.575 m (5' 2\")  Wt 80.9 kg (178 lb 5.6 oz)  SpO2 95%  BMI 32.62 kg/m2  Patient has NG tube in but not being utilized at this time. Patient is tolerating clear liquids this evening, she is swallowing without difficulty but slow. Patient complains of pain to upper right side of face down to lip. She is receiving PO Dilaudid with satisfactory relief of pain. Patient states she would like to get NG tube removed but that will depend on her ability to eat and take medication. She is pleasant and cooperative. She is alert and oriented x 4 and able to make needs known. Will continue to monitor.  "

## 2017-12-29 NOTE — PROGRESS NOTES
Pt requesting dose of PO Dilaudid prior to discharge- Pt would also like Rx of this for at home when tylenol isn't providing enough pain relief. Dr Wooten notified and aware and reports he will be providing Rx.

## 2017-12-29 NOTE — PLAN OF CARE
"Problem: Patient Care Overview  Goal: Plan of Care/Patient Progress Review  Outcome: No Change  /59 (BP Location: Right arm)  Temp 97.9  F (36.6  C) (Oral)  Resp 18  Ht 1.575 m (5' 2\")  Wt 80.9 kg (178 lb 5.6 oz)  SpO2 96%  BMI 32.62 kg/m2 on 2L n/c.Pt c/o pain in mouth and gauze intact and sutured in top of mouth.Pt having hard time swollowing pills or crushed in apple sauce like on marilyn shift.Refused and called pharmacy to change to liquids.Pt was medicated with dilaudid x1 and did sleep for a longer time.Up to bathroom with assist of 1 and voiding good amounts.Pt taking small amounts of water and denies nausea.Lungs clear.Will continue to monitor.      "

## 2017-12-29 NOTE — PROGRESS NOTES
Reviewed and discussed d/c instructions with patient and family.  All questions were answered and PIV was removed.

## 2018-01-03 ENCOUNTER — OFFICE VISIT (OUTPATIENT)
Dept: OTOLARYNGOLOGY | Facility: CLINIC | Age: 78
End: 2018-01-03
Payer: COMMERCIAL

## 2018-01-03 VITALS — BODY MASS INDEX: 32.39 KG/M2 | HEIGHT: 62 IN | WEIGHT: 176 LBS

## 2018-01-03 DIAGNOSIS — C05.0 SQUAMOUS CELL CARCINOMA OF HARD PALATE (H): ICD-10-CM

## 2018-01-03 LAB — COPATH REPORT: NORMAL

## 2018-01-03 RX ORDER — HYDROMORPHONE HYDROCHLORIDE 2 MG/1
2 TABLET ORAL
Qty: 45 TABLET | Refills: 0 | Status: SHIPPED | OUTPATIENT
Start: 2018-01-03 | End: 2018-03-23

## 2018-01-03 ASSESSMENT — PAIN SCALES - GENERAL: PAINLEVEL: SEVERE PAIN (7)

## 2018-01-03 NOTE — PATIENT INSTRUCTIONS
1. Please follow-up in clinic in 1 month with Dr. Alexander.   2. Please call the ENT clinic with any questions,concerns, new or worsening symptoms.    -Clinic number is [phone]   - [facility] direct line (Dr. Alexander's nurse) [phone]    3. A refill for dilaudid was given to you today. Please take this to the pharmacy and take as directed.

## 2018-01-03 NOTE — MR AVS SNAPSHOT
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 Please follow-up in clinic in 1 month with Dr. Alexander.   2. Please call the ENT clinic with any questions,concerns, new or worsening symptoms.    -Clinic number is [phone]   - [facility]'s direct line (Dr. Alexander's nurse) [phone]    3. A refill for dilaudid was given to you today. Please take this to the pharmacy and take as directed.           Follow-ups after your visit        Your next 10 appointments already scheduled     Feb 09, 2018  3:00 PM CST   (Arrive by 2:45 PM)   Return Visit with MD STEVIE Overton [facility] ([facility])    [address] [place] [id number] [phone]              Who to contact     Please call your clinic at [phone] to:    Ask questions about your health    Make or cancel appointments    Discuss your medicines    Learn about your test results    Speak to your doctor   If you have compliments or concerns about an experience at your clinic, or if you wish to file a complaint, please contact [facility] Patient Relations at [phone] or email us at [email or web address]         Additional Information About Your Visit        MyChart Information     [employer] is an electronic gateway that provides easy, online access to your medical records. With [employer], you can request a clinic appointment, read your test results, renew a prescription or communicate with your care team.     To sign up for [employer] visit the website at [email or web address]   You will be asked to enter the access code  "listed below, as well as some personal information. Please follow the directions to create your username and password.     Your access code is: [email or web address]  Expires: 2018  5:30 AM     Your access code will  in 90 days. If you need help or a new code, please contact your [facility] or call [phone] for assistance.        Care EveryWhere ID     This is your Care EveryWhere ID. This could be used by other organizations to access your [facility] medical records  [id number]        Your Vitals Were     Height BMI (Body Mass Index)                1.575 m (5' 2\") 32.19 kg/m2           Blood Pressure from Last 3 Encounters:   17 129/56   11/10/17 120/62    Weight from Last 3 Encounters:   18 79.8 kg (176 lb)   17 80.9 kg (178 lb 5.6 oz)   17 83 kg (183 lb)              Today, you had the following     No orders found for display         Today's Medication Changes          These changes are accurate as of: 1/3/18  4:47 PM.  If you have any questions, ask your nurse or doctor.               Start taking these medicines.        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  Ask your nurse if you have questions.     Bring a paper prescription for each of these medications     HYDROmorphone 2 MG tablet                Primary Care Provider Office Phone # Fax #    Bryon Davis [phone]       [facility] INTERNAL [facility] [address] [facility] [address]        Equal Access to Services     PAULETTE BOSWELL AH: Paula Farooq, gracy fernandez, leandro clevel[place] " graeme guidrybashir mirelesaajeannine ah. [facility] [phone].    ATENCIÓN: Si russella kamilla, tiene a rosas disposición servicios gratuitos de asistencia lingüística. Tashi al [phone].    We comply with applicable federal civil rights laws and [place] laws. We do not discriminate on the basis of race, color, national origin, age, disability, sex, sexual orientation, or gender identity.            Thank you!     Thank you for choosing [facility] TH[facility]  for your care. Our goal is always to provide you with excellent care. Hearing back from our patients is one way we can continue to improve our services. Please take a few minutes to complete the written survey that you may receive in the mail after your visit with us. Thank you!             Your Updated Medication List - Protect others around you: Learn how to safely use, store and throw away your medicines at www.disposemymeds.org.          This list is accurate as of: 1/3/18  4:47 PM.  Always use your most recent med list.                   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 When not flaring, use only Saturday & Sunday.        calcipotriene 0.005 % cream    DOVONOX     Apply twice daily to palms and soles.         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 DO NOT CRUSH OR CHEW        omeprazole 40 MG capsule    priLOSEC    60 capsule    Take 1 capsule (40 mg) by mouth 2 times daily        ondansetron 4 MG ODT tab    ZOFRAN ODT    30 tablet    Take 1-2 tablets (4-8 mg) by mouth 3 times daily (before meals)    PONV (postoperative nausea and vomiting)       spironolactone 25 MG tablet    ALDACTONE     Take 25 mg by mouth        tazarotene 0.1 % Crea      Apply to palms and soles 3 times weekly        * Notice:  This list has 3 medication(s) that are the same as other medications prescribed for you. Read the directions carefully, and ask your doctor or other care provider to review them with you.

## 2018-01-03 NOTE — LETTER
1/3/2018       RE: Krista Pelayo  [address] [facility] [address]     Dear Colleague,    Thank you for referring your patient, Krista Pelayo, to the [facility] EAR NOSE AND THROAT at [facility]. Please see a copy of my visit note below.    Dear Dr. Cox:    I had the pleasure of seeing Krista Pelayo in follow-up today at the [facility].     History of Present Illness:   Krista Pelayo is a 77-year-old woman with a hard palate squamous cell carcinoma. She had a lesion on her palate initially noticed 8 months ago by her dentist. She was seen by Dr. Cox who performed a biopsy which was benign. She was taken to the operating room by Dr. Cox who used the radiofrequency wand to resect the palatal lesion which was a 2 cm area of resection. Final pathology from this resection was consistent with an invasive squamous cell carcinoma with positive lateral and deep margins. She was seen in clinic 11/20/2017 to discuss re-excision. She had a negative PET scan.    Interval history:  Her case was reviewed at tumor board with the recommendation for re-excision without maxillectomy. She elected to wait until after the holidays for her surgery. She was taken to the OR on 12/28/2017 for palatal resection and dental extractions. Final defect was 5 x 3 cm. All intraoperative margins were negative. A bolster was placed over the defect intraoperatively. Biopsies were also performed of the tongue for some superficial ulceration which were negative. She was admitted postop but discharged without feeding tube in place. Her pathology showed a 1.6 cm tumor with no LVSI or PNI, 3 mm depth of invasion, negative margins with closest margin being 1 mm at the deep margin.    She returns today for follow up. She reports she has been eating and drinking enough at home. Her family reinforces this information. She is having some pain and is  taking dilaudid 2 mg every 4 hours but feels like she continues to have pain. She is not taking the tylenol.        MEDICATIONS:     Current Outpatient Prescriptions   Medication Sig Dispense Refill     HYDROmorphone (DILAUDID) 2 MG tablet Take 1 tablet (2 mg) by mouth every 3 hours as needed for moderate to severe pain 45 tablet 0     acetaminophen (TYLENOL) 32 mg/mL solution Take 20.3 mLs (650 mg) by mouth every 6 hours 473 mL 1     amoxicillin-clavulanate (AUGMENTIN) 400-57 MG/5ML suspension Take 10.9 mLs (875 mg) by mouth 2 times daily for 7 days 152.6 mL 0     chlorhexidine (PERIDEX) 0.12 % solution Swish and spit 15 mLs in mouth 3 times daily for 7 days 315 mL 0     Docusate Sodium 150 MG/15ML LIQD Take 100 mg by mouth 2 times daily as needed 150 mL 1     ondansetron (ZOFRAN ODT) 4 MG ODT tab Take 1-2 tablets (4-8 mg) by mouth 3 times daily (before meals) 30 tablet 1     acetaminophen (TYLENOL) 325 MG tablet Take 2 tablets (650 mg) by mouth every 6 hours 50 tablet 1     ACETAMINOPHEN, ANALGESICS OTHER, Take 650 mg by mouth       augmented betamethasone dipropionate (DIPROLENE-AF) 0.05 % ointment Apply a thin layer to psoriasis on palms and soles twice daily when flaring. When not flaring, use only Saturday & Sunday.       calcipotriene (DOVONOX) 0.005 % cream Apply twice daily to palms and soles.       ferrous sulfate (IRON) 325 (65 FE) MG tablet Take 325 mg by mouth       losartan (COZAAR) 50 MG tablet Take 50 mg by mouth       magnesium oxide (MAG-OX) 400 MG tablet Take 400 mg by mouth       metoprolol (TOPROL-XL) 50 MG 24 hr tablet TAKE 1 CAPSULE BY MOUTH DAILY. DO NOT CRUSH OR CHEW       spironolactone (ALDACTONE) 25 MG tablet Take 25 mg by mouth       tazarotene 0.1 % CREA Apply to palms and soles 3 times weekly       omeprazole (PRILOSEC) 40 MG capsule Take 1 capsule (40 mg) by mouth 2 times daily 60 capsule 0     lidocaine, viscous, (XYLOCAINE) 2 % solution Take 15 mLs by mouth every 8 hours as needed  for moderate pain swish and spit every 3-8 hours as needed; max 8 doses/24 hour period 100 mL 0       ALLERGIES:    Allergies   Allergen Reactions     Atorvastatin      Myalgias     Codeine Nausea and Vomiting     chills     Fluconazole      Other reaction(s): Other  Lip swelling associated with sores      Hydrocodone-Acetaminophen Nausea and Vomiting     Meperidine Nausea and Vomiting     Pravastatin      Other reaction(s): Muscle weakness     Propoxyphene N-Apap Nausea and Vomiting     Ultram [Tramadol] Swelling     numbness face, arms, difficulity with speech     Adhesive Tape Rash     Latex Rash     Morphine Rash     No Clinical Screening - See Comments Rash and Swelling     ADHESIVE TAPE, NARCOTICS  red streaks on arms  TREES     Sulfa Drugs Rash       HABITS/SOCIAL HISTORY:   She has smoked occasionally in the past, denies any chewing tobacco use, drinks occasional alcohol.   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     Social History     Social History     Marital status:      Spouse name: N/A     Number of children: N/A     Years of education: N/A     Occupational History     Not on file.     Social History Main Topics     Smoking status: Former Smoker     Smokeless tobacco: Never Used      Comment: only smoked briefly as a teenager      Alcohol use Yes      Comment: 2x a month     Drug use: No     Sexual activity: Not on file     Other Topics Concern     Not on file     Social History Narrative       PAST MEDICAL HISTORY:   Past Medical History:   Diagnosis Date     Cancer (H)      Hypertension      PONV (postoperative nausea and vomiting)         PAST SURGICAL HISTORY:   Past Surgical History:   Procedure Laterality Date     ESOPHAGOSCOPY, GASTROSCOPY, DUODENOSCOPY (EGD), COMBINED N/A 11/10/2017    Procedure: COMBINED ESOPHAGOSCOPY, GASTROSCOPY, DUODENOSCOPY (EGD);;  Surgeon: Matt Zarate MD;  Location:  GI     GENITOURINARY SURGERY      KIDNEY, STENT     ORTHOPEDIC  "SURGERY      ltk     PALATECTOMY Right 12/28/2017    Procedure: PALATECTOMY;  Right Palatectomy, Tongue Biopsy, Dental Extraction and NGT Placement;  Surgeon: Cee Alexander MD;  Location:  OR       FAMILY HISTORY:  History reviewed. No pertinent family history.    REVIEW OF SYSTEMS:  12 point ROS was negative other than the symptoms noted above in the HPI.  Patient Supplied Answers to Review of Systems  [facility] ROS 11/20/2017   Ears, Nose, Throat Nasal congestion or drainage, Trouble swallowing, Hoarseness   Musculoskeletal Sore or stiff joints   Allergy/Immunology Allergies or hay fever, Skin changes, Rash   Other Rash, Problems with anesthesia in surgery         PHYSICAL EXAMINATION:   Ht 1.575 m (5' 2\")  Wt 79.8 kg (176 lb)  BMI 32.19 kg/m2  Appearance:   normal; NAD, age-appropriate appearance, well-developed, normal habitus   Communication:   normal; communicates verbally, normal voice quality   Head/Face:   inspection -  Normal; no scars or visible lesions   Skin:  normal, no rash   Oral Cavity:  lips -  Normal mucosa, oral competence, and stoma size   Age-appropriate dentition with missing teeth present on the right side including #2, #3, #4  The bolster was removed today. The underlying maxilla is surgically exposed. All the mucosal tissue is well healed. There is no evidence of a fistula to the soft palate. The dental extraction sites look healthy.    Tongue - normal movement. The tongue biopsy sites are healing well.    Oropharynx:  mucosa -  Normal, no lesions  soft palate -  Normal, no lesions, no asymmetry, normal elevation  tonsils -  Normal, no exudates, no abnormal lesions, symmetric   Neck: No visible mass or asymmetry    Lymphatic:  no abnormal nodes         RESULTS REVIEWED:   A. LEFT LATERAL TONGUE BIOPSY:   - Squamous mucosa, negative for dysplasia or malignancy.     B. TEETH #4 & 5, EXTRACTION:   - Two teeth (gross examination only; see gross description for details).     C. RIGHT " POSTERIOR BUCCAL MUCOSAL MARGIN:   - Squamous mucosa, submucosa and striate muscle, negative for dysplasia or    malignancy.     D. RIGHT ANTERIOR BUCCAL MUCOSAL MARGIN:   - Squamous mucosa, submucosa and striate muscle, negative for dysplasia or    malignancy.     E. RIGHT POSTERIOR HARD PALATE:   - Squamous mucosa and submucosa, negative for dysplasia or malignancy.     F. RIGHT ANTERIOR HARD PALATE MARGIN:   - Squamous mucosa and submucosa, negative for dysplasia or malignancy.     G. TOOTH #3, EXTRACTION:   - Tooth (gross examination only; see gross description for details).     H. SOFT TISSUE AROUND TOOTH #3:   - Scant fragments of squamous mucosa, negative for malignancy.     I. ALVEOLUS AROUND TOOTH #3:   - Fragments of bone, negative for malignancy.     J. SOFT TISSUE BETWEEN 2 & 3:   - Squamous mucosa with mild dysplasia, negative for carcinoma.     K. ORAL CAVITY, RIGHT PALATECTOMY:   - INVASIVE KERATINIZING SQUAMOUS CELL CARCINOMA, well-differentiated, 1.6   cm in greatest dimension.   - Tumor invades to a depth of 0.3 cm.   - Lymphovascular space and perineural invasion are not identified.   - Surgical margins are negative for in-situ and invasive tumor; the deep   margin is closely approached at less   than 1 mm; all other margins at 3 mm or more from tumor.   - The AJCC pathologic staging is pT1 NX.   - See synoptic report.     L. RIGHT POSTERIOR SOFT PALATE MARGIN:   - Minor salivary gland tissue, negative for carcinoma.     M. LEFT LATERAL TONGUE BIOPSY:   - Ulcerated mucosa with acute inflammation and granulation tissue,   negative for carcinoma.     N. RIGHT ALVEOLUS:   - Fragments of bone, negative for carcinoma.       IMPRESSION AND PLAN:   Patient is a 77-year-old woman with a likely T2N0 SCC of the palate (given size of previous resection). The patient is now s/p palatectomy on 12/28/2017. Her pathology results were reviewed with her today. This shows negative margins with no evidence of  lymphovascular or perineural invasion. The tumor has 3 mm depth of invasion. The closest margin was the deep margin as expected.  We will review her case at tumor board on Friday and contact her with final recommendations. In particular, we need to discuss if the periosteum was invaded. If it was invaded, she may need a maxillectomy and reconstruction vs obturator. We will contact her with the results of the tumor board discussion to determine appropriate follow up or further surgery. If no recommendation for future surgery, we will see her back in one month. She can advance her diet as tolerated now. She knows to continue good oral cares. Her pain medications were refilled today.    Thank you very much for the opportunity to participate in the care of your patient.      Catrachito Willis MD  Otolaryngology Resident      Teaching statement:  I evaluated the patient with the resident and have edited the note to reflect my history, exam and recommendations.    Cee Alexander M.D.  Otolaryngology- Head & Neck Surgery      CC:  Pradip Cox MD, [facility], [facility] Medical Office [address], [address]  [place]  [address]

## 2018-01-03 NOTE — PROGRESS NOTES
Dear Dr. Cox:    I had the pleasure of seeing Krista Pelayo in follow-up today at the [facility].     History of Present Illness:   Krista Pelayo is a 77-year-old woman with a hard palate squamous cell carcinoma. She had a lesion on her palate initially noticed 8 months ago by her dentist. She was seen by Dr. Cox who performed a biopsy which was benign. She was taken to the operating room by Dr. Cox who used the radiofrequency wand to resect the palatal lesion which was a 2 cm area of resection. Final pathology from this resection was consistent with an invasive squamous cell carcinoma with positive lateral and deep margins. She was seen in clinic 11/20/2017 to discuss re-excision. She had a negative PET scan.    Interval history:  Her case was reviewed at tumor board with the recommendation for re-excision without maxillectomy. She elected to wait until after the holidays for her surgery. She was taken to the OR on 12/28/2017 for palatal resection and dental extractions. Final defect was 5 x 3 cm. All intraoperative margins were negative. A bolster was placed over the defect intraoperatively. Biopsies were also performed of the tongue for some superficial ulceration which were negative. She was admitted postop but discharged without feeding tube in place. Her pathology showed a 1.6 cm tumor with no LVSI or PNI, 3 mm depth of invasion, negative margins with closest margin being 1 mm at the deep margin.    She returns today for follow up. She reports she has been eating and drinking enough at home. Her family reinforces this information. She is having some pain and is taking dilaudid 2 mg every 4 hours but feels like she continues to have pain. She is not taking the tylenol.        MEDICATIONS:     Current Outpatient Prescriptions   Medication Sig Dispense Refill     HYDROmorphone (DILAUDID) 2 MG tablet Take 1 tablet (2 mg) by mouth every 3 hours as needed  for moderate to severe pain 45 tablet 0     acetaminophen (TYLENOL) 32 mg/mL solution Take 20.3 mLs (650 mg) by mouth every 6 hours 473 mL 1     amoxicillin-clavulanate (AUGMENTIN) 400-57 MG/5ML suspension Take 10.9 mLs (875 mg) by mouth 2 times daily for 7 days 152.6 mL 0     chlorhexidine (PERIDEX) 0.12 % solution Swish and spit 15 mLs in mouth 3 times daily for 7 days 315 mL 0     Docusate Sodium 150 MG/15ML LIQD Take 100 mg by mouth 2 times daily as needed 150 mL 1     ondansetron (ZOFRAN ODT) 4 MG ODT tab Take 1-2 tablets (4-8 mg) by mouth 3 times daily (before meals) 30 tablet 1     acetaminophen (TYLENOL) 325 MG tablet Take 2 tablets (650 mg) by mouth every 6 hours 50 tablet 1     ACETAMINOPHEN, ANALGESICS OTHER, Take 650 mg by mouth       augmented betamethasone dipropionate (DIPROLENE-AF) 0.05 % ointment Apply a thin layer to psoriasis on palms and soles twice daily when flaring. When not flaring, use only Saturday & Sunday.       calcipotriene (DOVONOX) 0.005 % cream Apply twice daily to palms and soles.       ferrous sulfate (IRON) 325 (65 FE) MG tablet Take 325 mg by mouth       losartan (COZAAR) 50 MG tablet Take 50 mg by mouth       magnesium oxide (MAG-OX) 400 MG tablet Take 400 mg by mouth       metoprolol (TOPROL-XL) 50 MG 24 hr tablet TAKE 1 CAPSULE BY MOUTH DAILY. DO NOT CRUSH OR CHEW       spironolactone (ALDACTONE) 25 MG tablet Take 25 mg by mouth       tazarotene 0.1 % CREA Apply to palms and soles 3 times weekly       omeprazole (PRILOSEC) 40 MG capsule Take 1 capsule (40 mg) by mouth 2 times daily 60 capsule 0     lidocaine, viscous, (XYLOCAINE) 2 % solution Take 15 mLs by mouth every 8 hours as needed for moderate pain swish and spit every 3-8 hours as needed; max 8 doses/24 hour period 100 mL 0       ALLERGIES:    Allergies   Allergen Reactions     Atorvastatin      Myalgias     Codeine Nausea and Vomiting     chills     Fluconazole      Other reaction(s): Other  Lip swelling associated  with sores      Hydrocodone-Acetaminophen Nausea and Vomiting     Meperidine Nausea and Vomiting     Pravastatin      Other reaction(s): Muscle weakness     Propoxyphene N-Apap Nausea and Vomiting     Ultram [Tramadol] Swelling     numbness face, arms, difficulity with speech     Adhesive Tape Rash     Latex Rash     Morphine Rash     No Clinical Screening - See Comments Rash and Swelling     ADHESIVE TAPE, NARCOTICS  red streaks on arms  TREES     Sulfa Drugs Rash       HABITS/SOCIAL HISTORY:   She has smoked occasionally in the past, denies any chewing tobacco use, drinks occasional alcohol.   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     Social History     Social History     Marital status:      Spouse name: N/A     Number of children: N/A     Years of education: N/A     Occupational History     Not on file.     Social History Main Topics     Smoking status: Former Smoker     Smokeless tobacco: Never Used      Comment: only smoked briefly as a teenager      Alcohol use Yes      Comment: 2x a month     Drug use: No     Sexual activity: Not on file     Other Topics Concern     Not on file     Social History Narrative       PAST MEDICAL HISTORY:   Past Medical History:   Diagnosis Date     Cancer (H)      Hypertension      PONV (postoperative nausea and vomiting)         PAST SURGICAL HISTORY:   Past Surgical History:   Procedure Laterality Date     ESOPHAGOSCOPY, GASTROSCOPY, DUODENOSCOPY (EGD), COMBINED N/A 11/10/2017    Procedure: COMBINED ESOPHAGOSCOPY, GASTROSCOPY, DUODENOSCOPY (EGD);;  Surgeon: Matt Zarate MD;  Location:  GI     GENITOURINARY SURGERY      KIDNEY, STENT     ORTHOPEDIC SURGERY      ltk     PALATECTOMY Right 12/28/2017    Procedure: PALATECTOMY;  Right Palatectomy, Tongue Biopsy, Dental Extraction and NGT Placement;  Surgeon: Cee Alexander MD;  Location:  OR       FAMILY HISTORY:  History reviewed. No pertinent family history.    REVIEW OF SYSTEMS:  12  "point ROS was negative other than the symptoms noted above in the HPI.  Patient Supplied Answers to Review of Systems  [facility] ROS 11/20/2017   Ears, Nose, Throat Nasal congestion or drainage, Trouble swallowing, Hoarseness   Musculoskeletal Sore or stiff joints   Allergy/Immunology Allergies or hay fever, Skin changes, Rash   Other Rash, Problems with anesthesia in surgery         PHYSICAL EXAMINATION:   Ht 1.575 m (5' 2\")  Wt 79.8 kg (176 lb)  BMI 32.19 kg/m2  Appearance:   normal; NAD, age-appropriate appearance, well-developed, normal habitus   Communication:   normal; communicates verbally, normal voice quality   Head/Face:   inspection -  Normal; no scars or visible lesions   Skin:  normal, no rash   Oral Cavity:  lips -  Normal mucosa, oral competence, and stoma size   Age-appropriate dentition with missing teeth present on the right side including #2, #3, #4  The bolster was removed today. The underlying maxilla is surgically exposed. All the mucosal tissue is well healed. There is no evidence of a fistula to the soft palate. The dental extraction sites look healthy.    Tongue - normal movement. The tongue biopsy sites are healing well.    Oropharynx:  mucosa -  Normal, no lesions  soft palate -  Normal, no lesions, no asymmetry, normal elevation  tonsils -  Normal, no exudates, no abnormal lesions, symmetric   Neck: No visible mass or asymmetry    Lymphatic:  no abnormal nodes         RESULTS REVIEWED:   A. LEFT LATERAL TONGUE BIOPSY:   - Squamous mucosa, negative for dysplasia or malignancy.     B. TEETH #4 & 5, EXTRACTION:   - Two teeth (gross examination only; see gross description for details).     C. RIGHT POSTERIOR BUCCAL MUCOSAL MARGIN:   - Squamous mucosa, submucosa and striate muscle, negative for dysplasia or    malignancy.     D. RIGHT ANTERIOR BUCCAL MUCOSAL MARGIN:   - Squamous mucosa, submucosa and striate muscle, negative for dysplasia or    malignancy.     E. RIGHT POSTERIOR HARD PALATE: "   - Squamous mucosa and submucosa, negative for dysplasia or malignancy.     F. RIGHT ANTERIOR HARD PALATE MARGIN:   - Squamous mucosa and submucosa, negative for dysplasia or malignancy.     G. TOOTH #3, EXTRACTION:   - Tooth (gross examination only; see gross description for details).     H. SOFT TISSUE AROUND TOOTH #3:   - Scant fragments of squamous mucosa, negative for malignancy.     I. ALVEOLUS AROUND TOOTH #3:   - Fragments of bone, negative for malignancy.     J. SOFT TISSUE BETWEEN 2 & 3:   - Squamous mucosa with mild dysplasia, negative for carcinoma.     K. ORAL CAVITY, RIGHT PALATECTOMY:   - INVASIVE KERATINIZING SQUAMOUS CELL CARCINOMA, well-differentiated, 1.6   cm in greatest dimension.   - Tumor invades to a depth of 0.3 cm.   - Lymphovascular space and perineural invasion are not identified.   - Surgical margins are negative for in-situ and invasive tumor; the deep   margin is closely approached at less   than 1 mm; all other margins at 3 mm or more from tumor.   - The AJCC pathologic staging is pT1 NX.   - See synoptic report.     L. RIGHT POSTERIOR SOFT PALATE MARGIN:   - Minor salivary gland tissue, negative for carcinoma.     M. LEFT LATERAL TONGUE BIOPSY:   - Ulcerated mucosa with acute inflammation and granulation tissue,   negative for carcinoma.     N. RIGHT ALVEOLUS:   - Fragments of bone, negative for carcinoma.       IMPRESSION AND PLAN:   Patient is a 77-year-old woman with a likely T2N0 SCC of the palate (given size of previous resection). The patient is now s/p palatectomy on 12/28/2017. Her pathology results were reviewed with her today. This shows negative margins with no evidence of lymphovascular or perineural invasion. The tumor has 3 mm depth of invasion. The closest margin was the deep margin as expected.  We will review her case at tumor board on Friday and contact her with final recommendations. In particular, we need to discuss if the periosteum was invaded. If it was  invaded, she may need a maxillectomy and reconstruction vs obturator. We will contact her with the results of the tumor board discussion to determine appropriate follow up or further surgery. If no recommendation for future surgery, we will see her back in one month. She can advance her diet as tolerated now. She knows to continue good oral cares. Her pain medications were refilled today.    Thank you very much for the opportunity to participate in the care of your patient.      Catrachito Willis MD  Otolaryngology Resident      Teaching statement:  I evaluated the patient with the resident and have edited the note to reflect my history, exam and recommendations.    Cee Alexander M.D.  Otolaryngology- Head & Neck Surgery      CC:  Pradip Cox MD, [facility], [facility] Medical Office [address], [address]  [place]  [address]

## 2018-02-09 ENCOUNTER — OFFICE VISIT (OUTPATIENT)
Dept: OTOLARYNGOLOGY | Facility: CLINIC | Age: 78
End: 2018-02-09
Payer: COMMERCIAL

## 2018-02-09 DIAGNOSIS — C05.9 MALIGNANT NEOPLASM OF PALATE (H): Primary | ICD-10-CM

## 2018-02-09 PROBLEM — L11.1 GROVER'S DISEASE: Status: ACTIVE | Noted: 2017-12-21

## 2018-02-09 PROBLEM — R25.2 CRAMP OF BOTH LOWER EXTREMITIES: Status: ACTIVE | Noted: 2017-07-19

## 2018-02-09 PROBLEM — L40.9 PSORIASIS: Status: ACTIVE | Noted: 2017-07-17

## 2018-02-09 ASSESSMENT — PAIN SCALES - GENERAL: PAINLEVEL: MILD PAIN (3)

## 2018-02-09 NOTE — PATIENT INSTRUCTIONS
1. Please follow-up in clinic at the end of March with repeat PET scan.   2. Please call the ENT clinic with any questions,concerns, new or worsening symptoms.    -Clinic number is [phone]   - Rekha's direct line (Dr. Alexander's nurse) [phone]

## 2018-02-09 NOTE — PROGRESS NOTES
Dear Dr. Cox:    I had the pleasure of seeing Krista Pelayo in follow-up today at the [facility].     History of Present Illness:   Krista Pelayo is a 78-year-old woman with a hard palate squamous cell carcinoma. She had a lesion on her palate initially noticed 8 months ago by her dentist. She was seen by Dr. Cox who performed a biopsy which was benign. She was taken to the operating room by Dr. Cox who used the radiofrequency wand to resect the palatal lesion which was a 2 cm area of resection. Final pathology from this resection was consistent with an invasive squamous cell carcinoma with positive lateral and deep margins. She had a negative PET scan.  She was taken to the OR on 12/28/2017 for palatal resection dental extraction.  Final defect was 5 x 3 cm.  All intraoperative margins were negative.  Her final pathology showed a 1.6 cm tumor with no lymphovascular space invasion or perineural invasion, 3 mm depth of invasion, negative margins with closest margin being 1 mm at the deep margin.    Interval history:  She comes in today for follow-up.  She was last seen in clinic January 2018.  Her pathology was reviewed at tumor board with the recommendation for close observation.  She says that she is improving since her last visit.  She is not taking any pain medicine since her last visit in clinic.  She feels like she has really improved in the last 2 weeks.  She is able to eat in general but does have some difficulty with some tenderness along her teeth with chewing.  She denies any neck masses.  She is not yet back to work.      MEDICATIONS:     Current Outpatient Prescriptions   Medication Sig Dispense Refill     HYDROmorphone (DILAUDID) 2 MG tablet Take 1 tablet (2 mg) by mouth every 3 hours as needed for moderate to severe pain 45 tablet 0     acetaminophen (TYLENOL) 32 mg/mL solution Take 20.3 mLs (650 mg) by mouth every 6 hours 473 mL 1     Docusate  Sodium 150 MG/15ML LIQD Take 100 mg by mouth 2 times daily as needed 150 mL 1     ondansetron (ZOFRAN ODT) 4 MG ODT tab Take 1-2 tablets (4-8 mg) by mouth 3 times daily (before meals) 30 tablet 1     acetaminophen (TYLENOL) 325 MG tablet Take 2 tablets (650 mg) by mouth every 6 hours 50 tablet 1     ACETAMINOPHEN, ANALGESICS OTHER, Take 650 mg by mouth       augmented betamethasone dipropionate (DIPROLENE-AF) 0.05 % ointment Apply a thin layer to psoriasis on palms and soles twice daily when flaring. When not flaring, use only Saturday & Sunday.       calcipotriene (DOVONOX) 0.005 % cream Apply twice daily to palms and soles.       ferrous sulfate (IRON) 325 (65 FE) MG tablet Take 325 mg by mouth       losartan (COZAAR) 50 MG tablet Take 50 mg by mouth       magnesium oxide (MAG-OX) 400 MG tablet Take 400 mg by mouth       metoprolol (TOPROL-XL) 50 MG 24 hr tablet TAKE 1 CAPSULE BY MOUTH DAILY. DO NOT CRUSH OR CHEW       spironolactone (ALDACTONE) 25 MG tablet Take 25 mg by mouth       tazarotene 0.1 % CREA Apply to palms and soles 3 times weekly       omeprazole (PRILOSEC) 40 MG capsule Take 1 capsule (40 mg) by mouth 2 times daily 60 capsule 0     lidocaine, viscous, (XYLOCAINE) 2 % solution Take 15 mLs by mouth every 8 hours as needed for moderate pain swish and spit every 3-8 hours as needed; max 8 doses/24 hour period 100 mL 0       ALLERGIES:    Allergies   Allergen Reactions     Atorvastatin      Myalgias     Codeine Nausea and Vomiting     chills     Fluconazole      Other reaction(s): Other  Lip swelling associated with sores      Hydrocodone-Acetaminophen Nausea and Vomiting     Meperidine Nausea and Vomiting     Pravastatin      Other reaction(s): Muscle weakness     Propoxyphene N-Apap Nausea and Vomiting     Ultram [Tramadol] Swelling     numbness face, arms, difficulity with speech     Adhesive Tape Rash     Latex Rash     Morphine Rash     No Clinical Screening - See Comments Rash and Swelling      ADHESIVE TAPE, NARCOTICS  red streaks on arms  TREES     Sulfa Drugs Rash       HABITS/SOCIAL HISTORY:   She has smoked occasionally in the past, denies any chewing tobacco use, drinks occasional alcohol.   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     Social History     Social History     Marital status:      Spouse name: N/A     Number of children: N/A     Years of education: N/A     Occupational History     Not on file.     Social History Main Topics     Smoking status: Former Smoker     Smokeless tobacco: Never Used      Comment: only smoked briefly as a teenager      Alcohol use Yes      Comment: 2x a month     Drug use: No     Sexual activity: Not on file     Other Topics Concern     Not on file     Social History Narrative       PAST MEDICAL HISTORY:   Past Medical History:   Diagnosis Date     Cancer (H)      Hypertension      PONV (postoperative nausea and vomiting)         PAST SURGICAL HISTORY:   Past Surgical History:   Procedure Laterality Date     ESOPHAGOSCOPY, GASTROSCOPY, DUODENOSCOPY (EGD), COMBINED N/A 11/10/2017    Procedure: COMBINED ESOPHAGOSCOPY, GASTROSCOPY, DUODENOSCOPY (EGD);;  Surgeon: Matt Zarate MD;  Location:  GI     GENITOURINARY SURGERY      KIDNEY, STENT     ORTHOPEDIC SURGERY      ltk     PALATECTOMY Right 12/28/2017    Procedure: PALATECTOMY;  Right Palatectomy, Tongue Biopsy, Dental Extraction and NGT Placement;  Surgeon: Cee Alexander MD;  Location:  OR       FAMILY HISTORY:  No family history on file.    REVIEW OF SYSTEMS:  12 point ROS was negative other than the symptoms noted above in the HPI.  Patient Supplied Answers to Review of Systems  [facility] ENT ROS 2/9/2018   Ears, Nose, Throat -   Musculoskeletal Sore or stiff joints   Allergy/Immunology Rash   Other -         PHYSICAL EXAMINATION:   There were no vitals taken for this visit.  Appearance:   normal; NAD, age-appropriate appearance, well-developed, normal habitus   Communication:    normal; communicates verbally, normal voice quality   Head/Face:   inspection -  Normal; no scars or visible lesions   Salivary glands -  Normal size, no tenderness, swelling, or palpable masses   Facial strength -  Normal and symmetric bilateral; H/B I/VI   Skin:  normal, no rash   Ocular Motility:  normal occular movements   Ears:  auricle (AD) -  normal  EAC (AD) -  normal  TM (AD) -  Normal, no effusion  auricle (AS) -  normal  EAC (AS) -  normal  TM (AS) -  Normal, no effusion  Normal clinical speech reception   Nose:  Ext. inspection -  Normal   Oral Cavity:  lips -  Normal mucosa, oral competence, and stoma size   Age-appropriate dentition with missing teeth on the right side secondary to the resection  Granulating alveolus on the right side with no obvious masses  Hard palate on the right side granulating with a concavity to the defect compared to the left side.  No evidence of recurrent disease.  Photodocumentation obtained in clinic today.   Tongue - normal movement, no lesions   Oropharynx:  mucosa -  Normal, no lesions  soft palate -  Normal, no lesions, no asymmetry, normal elevation  tonsils -  Normal, no exudates, no abnormal lesions, symmetric   Neck: No visible mass or asymmetry   Normal palpation, no tenderness, no tracheal deviation  Normal range of motion   Lymphatic:  no abnormal nodes   Cardiovascular:  warm, pink, well-perfused extremities without swelling, tenderness, or edema   Respiratory:  Normal respiratory effort, no stridor   Neuro/Psych.:  mood/affect -  normal  mental status -  normal  cranial nerves -  normal          IMPRESSION AND PLAN:   Patient is a 78-year-old woman with a likely T2N0 SCC of the palate (given size of previous resection). The patient is now s/p palatectomy on 12/28/2017.  She has no evidence of recurrent disease on exam today.  We will continue to closely observe the area in her oral cavity for any signs of recurrence.  We have obtained photos in clinic which  are saved to epic for comparison.  I will see her back in clinic in about 6 weeks with her 3 month post-treatment PET scan.    Thank you very much for the opportunity to participate in the care of your patient.        Cee Alexander M.D.  Otolaryngology- Head & Neck Surgery      CC:  Pradip Cox MD, [facility], [facility] Office [address], [address] [place] [address]

## 2018-02-09 NOTE — MR AVS SNAPSHOT
After Visit Summary   2018    Krista Pelayo    MRN: [id number]           Patient Information     Date Of Birth          1940        Visit Information        Provider Department      2018 3:00 PM Cee Alexander MD [facility] Ear Nose and Throat        Today's Diagnoses     Malignant neoplasm of palate (H)    -  1      Care Instructions    1. Please follow-up in clinic at the end of March with repeat PET scan.   2. Please call the ENT clinic with any questions,concerns, new or worsening symptoms.    -Clinic number is [phone]   - Rekha's direct line (Dr. Alexander's nurse) [phone]              Follow-ups after your visit        Future tests that were ordered for you today     Open Future Orders        Priority Expected Expires Ordered    PET Oncology (Eyes to Thighs) Routine  2019    Creatinine Routine  2019            Who to contact     Please call your clinic at [phone] to:    Ask questions about your health    Make or cancel appointments    Discuss your medicines    Learn about your test results    Speak to your doctor            Additional Information About Your Visit        MyChart Information     [employer] is an electronic gateway that provides easy, online access to your medical records. With Grand[facility], you can request a clinic appointment, read your test results, renew a prescription or communicate with your care team.     To sign up for [facility]is visit the website at [email or web address]   You will be asked to enter the access code listed below, as well as some personal information. Please follow the directions to create your username and password.     Your access code is: [email or web address]  Expires: 5/10/2018  5:59 PM     Your access code will  in 90 days. If you need help or a new code, please contact your [facility] or call [phone] for assistance.        Care EveryWhere ID     This is your Care  EveryWhere ID. This could be used by other organizations to access your [facility] medical records  [id number]         Blood Pressure from Last 3 Encounters:   12/29/17 129/56   11/10/17 120/62    Weight from Last 3 Encounters:   01/03/18 79.8 kg (176 lb)   12/28/17 80.9 kg (178 lb 5.6 oz)   11/20/17 83 kg (183 lb)              We Performed the Following     IMAGESTREAM RECORDING ORDER        Primary Care Provider Office Phone # Fax #    Too Cannon [phone]       [facility] INTERNAL  [address] [facility] [address]        Equal Access to Services     [facility]: Hadii aad ku hadasho Soomaali, waaxda luqadaha, qaybta kaalmada adeegyada, graeme bradenn doc lemus . So [facility] [phone].    ATENCIÓN: Si habla español, tiene a rosas disposición servicios gratuitos de asistencia lingüística. Llame al [phone].    We comply with applicable federal civil rights laws and [place] laws. We do not discriminate on the basis of race, color, national origin, age, disability, sex, sexual orientation, or gender identity.            Thank you!     Thank you for choosing [facility] EAR NOSE AND THROAT  for your care. Our goal is always to provide you with excellent care. Hearing back from our patients is one way we can continue to improve our services. Please take a few minutes to complete the written survey that you may receive in the mail after your visit with us. Thank you!             Your Updated Medication List - Protect others around you: Learn how to safely use, store and throw away your medicines at www.disposemymeds.org.          This list is accurate as of 2/9/18  5:59 PM.  Always use your most recent med list.                   Brand Name Dispense Instructions for use Diagnosis    * ACETAMINOPHEN (ANALGESICS OTHER)      Take 650 mg by mouth        * acetaminophen 325 MG tablet    TYLENOL    50 tablet    Take 2 tablets (650 mg) by mouth every 6 hours    Squamous cell carcinoma of hard  palate (H)       * acetaminophen 32 mg/mL solution    TYLENOL    473 mL    Take 20.3 mLs (650 mg) by mouth every 6 hours    Squamous cell carcinoma of hard palate (H)       augmented betamethasone dipropionate 0.05 % ointment    DIPROLENE-AF     Apply a thin layer to psoriasis on palms and soles twice daily when flaring. When not flaring, use only Saturday & Sunday.        calcipotriene 0.005 % cream    DOVONOX     Apply twice daily to palms and soles.        Docusate Sodium 150 MG/15ML Liqd     150 mL    Take 100 mg by mouth 2 times daily as needed    Squamous cell carcinoma of hard palate (H)       ferrous sulfate 325 (65 FE) MG tablet    IRON     Take 325 mg by mouth        HYDROmorphone 2 MG tablet    DILAUDID    45 tablet    Take 1 tablet (2 mg) by mouth every 3 hours as needed for moderate to severe pain    Squamous cell carcinoma of hard palate (H)       lidocaine (viscous) 2 % solution    XYLOCAINE    100 mL    Take 15 mLs by mouth every 8 hours as needed for moderate pain swish and spit every 3-8 hours as needed; max 8 doses/24 hour period        losartan 50 MG tablet    COZAAR     Take 50 mg by mouth        magnesium oxide 400 MG tablet    MAG-OX     Take 400 mg by mouth        metoprolol succinate 50 MG 24 hr tablet    TOPROL-XL     TAKE 1 CAPSULE BY MOUTH DAILY. DO NOT CRUSH OR CHEW        omeprazole 40 MG capsule    priLOSEC    60 capsule    Take 1 capsule (40 mg) by mouth 2 times daily        ondansetron 4 MG ODT tab    ZOFRAN ODT    30 tablet    Take 1-2 tablets (4-8 mg) by mouth 3 times daily (before meals)    PONV (postoperative nausea and vomiting)       spironolactone 25 MG tablet    ALDACTONE     Take 25 mg by mouth        tazarotene 0.1 % Crea      Apply to palms and soles 3 times weekly        * Notice:  This list has 3 medication(s) that are the same as other medications prescribed for you. Read the directions carefully, and ask your doctor or other care provider to review them with you.

## 2018-02-09 NOTE — LETTER
2/9/2018       RE: Krista Pelayo  [address] [address]     Dear Colleague,    Thank you for referring your patient, Krista Pelayo, to the [facility] EAR NOSE AND THROAT at [facility]. Please see a copy of my visit note below.    Dear Dr. Cox:    I had the pleasure of seeing Krista Pelayo in follow-up today at the [facility] Otol[facility].     History of Present Illness:   Krista Pelayo is a 78-year-old woman with a hard palate squamous cell carcinoma. She had a lesion on her palate initially noticed 8 months ago by her dentist. She was seen by Dr. Cox who performed a biopsy which was benign. She was taken to the operating room by Dr. Cox who used the radiofrequency wand to resect the palatal lesion which was a 2 cm area of resection. Final pathology from this resection was consistent with an invasive squamous cell carcinoma with positive lateral and deep margins. She had a negative PET scan.  She was taken to the OR on 12/28/2017 for palatal resection dental extraction.  Final defect was 5 x 3 cm.  All intraoperative margins were negative.  Her final pathology showed a 1.6 cm tumor with no lymphovascular space invasion or perineural invasion, 3 mm depth of invasion, negative margins with closest margin being 1 mm at the deep margin.    Interval history:  She comes in today for follow-up.  She was last seen in clinic January 2018.  Her pathology was reviewed at tumor board with the recommendation for close observation.  She says that she is improving since her last visit.  She is not taking any pain medicine since her last visit in clinic.  She feels like she has really improved in the last 2 weeks.  She is able to eat in general but does have some difficulty with some tenderness along her teeth with chewing.  She denies any neck masses.  She is not yet back to work.      MEDICATIONS:     Current Outpatient  Prescriptions   Medication Sig Dispense Refill     HYDROmorphone (DILAUDID) 2 MG tablet Take 1 tablet (2 mg) by mouth every 3 hours as needed for moderate to severe pain 45 tablet 0     acetaminophen (TYLENOL) 32 mg/mL solution Take 20.3 mLs (650 mg) by mouth every 6 hours 473 mL 1     Docusate Sodium 150 MG/15ML LIQD Take 100 mg by mouth 2 times daily as needed 150 mL 1     ondansetron (ZOFRAN ODT) 4 MG ODT tab Take 1-2 tablets (4-8 mg) by mouth 3 times daily (before meals) 30 tablet 1     acetaminophen (TYLENOL) 325 MG tablet Take 2 tablets (650 mg) by mouth every 6 hours 50 tablet 1     ACETAMINOPHEN, ANALGESICS OTHER, Take 650 mg by mouth       augmented betamethasone dipropionate (DIPROLENE-AF) 0.05 % ointment Apply a thin layer to psoriasis on palms and soles twice daily when flaring. When not flaring, use only Saturday & Sunday.       calcipotriene (DOVONOX) 0.005 % cream Apply twice daily to palms and soles.       ferrous sulfate (IRON) 325 (65 FE) MG tablet Take 325 mg by mouth       losartan (COZAAR) 50 MG tablet Take 50 mg by mouth       magnesium oxide (MAG-OX) 400 MG tablet Take 400 mg by mouth       metoprolol (TOPROL-XL) 50 MG 24 hr tablet TAKE 1 CAPSULE BY MOUTH DAILY. DO NOT CRUSH OR CHEW       spironolactone (ALDACTONE) 25 MG tablet Take 25 mg by mouth       tazarotene 0.1 % CREA Apply to palms and soles 3 times weekly       omeprazole (PRILOSEC) 40 MG capsule Take 1 capsule (40 mg) by mouth 2 times daily 60 capsule 0     lidocaine, viscous, (XYLOCAINE) 2 % solution Take 15 mLs by mouth every 8 hours as needed for moderate pain swish and spit every 3-8 hours as needed; max 8 doses/24 hour period 100 mL 0       ALLERGIES:    Allergies   Allergen Reactions     Atorvastatin      Myalgias     Codeine Nausea and Vomiting     chills     Fluconazole      Other reaction(s): Other  Lip swelling associated with sores      Hydrocodone-Acetaminophen Nausea and Vomiting     Meperidine Nausea and Vomiting      Pravastatin      Other reaction(s): Muscle weakness     Propoxyphene N-Apap Nausea and Vomiting     Ultram [Tramadol] Swelling     numbness face, arms, difficulity with speech     Adhesive Tape Rash     Latex Rash     Morphine Rash     No Clinical Screening - See Comments Rash and Swelling     ADHESIVE TAPE, NARCOTICS  red streaks on arms  TREES     Sulfa Drugs Rash       HABITS/SOCIAL HISTORY:   She has smoked occasionally in the past, denies any chewing tobacco use, drinks occasional alcohol.   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.     Social History     Social History     Marital status:      Spouse name: N/A     Number of children: N/A     Years of education: N/A     Occupational History     Not on file.     Social History Main Topics     Smoking status: Former Smoker     Smokeless tobacco: Never Used      Comment: only smoked briefly as a teenager      Alcohol use Yes      Comment: 2x a month     Drug use: No     Sexual activity: Not on file     Other Topics Concern     Not on file     Social History Narrative       PAST MEDICAL HISTORY:   Past Medical History:   Diagnosis Date     Cancer (H)      Hypertension      PONV (postoperative nausea and vomiting)         PAST SURGICAL HISTORY:   Past Surgical History:   Procedure Laterality Date     ESOPHAGOSCOPY, GASTROSCOPY, DUODENOSCOPY (EGD), COMBINED N/A 11/10/2017    Procedure: COMBINED ESOPHAGOSCOPY, GASTROSCOPY, DUODENOSCOPY (EGD);;  Surgeon: Matt Zarate MD;  Location:  GI     GENITOURINARY SURGERY      KIDNEY, STENT     ORTHOPEDIC SURGERY      ltk     PALATECTOMY Right 12/28/2017    Procedure: PALATECTOMY;  Right Palatectomy, Tongue Biopsy, Dental Extraction and NGT Placement;  Surgeon: Cee Alexander MD;  Location:  OR       FAMILY HISTORY:  No family history on file.    REVIEW OF SYSTEMS:  12 point ROS was negative other than the symptoms noted above in the HPI.  Patient Supplied Answers to Review of  Systems   ENT ROS 2/9/2018   Ears, Nose, Throat -   Musculoskeletal Sore or stiff joints   Allergy/Immunology Rash   Other -         PHYSICAL EXAMINATION:   There were no vitals taken for this visit.  Appearance:   normal; NAD, age-appropriate appearance, well-developed, normal habitus   Communication:   normal; communicates verbally, normal voice quality   Head/Face:   inspection -  Normal; no scars or visible lesions   Salivary glands -  Normal size, no tenderness, swelling, or palpable masses   Facial strength -  Normal and symmetric bilateral; H/B I/VI   Skin:  normal, no rash   Ocular Motility:  normal occular movements   Ears:  auricle (AD) -  normal  EAC (AD) -  normal  TM (AD) -  Normal, no effusion  auricle (AS) -  normal  EAC (AS) -  normal  TM (AS) -  Normal, no effusion  Normal clinical speech reception   Nose:  Ext. inspection -  Normal   Oral Cavity:  lips -  Normal mucosa, oral competence, and stoma size   Age-appropriate dentition with missing teeth on the right side secondary to the resection  Granulating alveolus on the right side with no obvious masses  Hard palate on the right side granulating with a concavity to the defect compared to the left side.  No evidence of recurrent disease.  Photodocumentation obtained in clinic today.   Tongue - normal movement, no lesions   Oropharynx:  mucosa -  Normal, no lesions  soft palate -  Normal, no lesions, no asymmetry, normal elevation  tonsils -  Normal, no exudates, no abnormal lesions, symmetric   Neck: No visible mass or asymmetry   Normal palpation, no tenderness, no tracheal deviation  Normal range of motion   Lymphatic:  no abnormal nodes   Cardiovascular:  warm, pink, well-perfused extremities without swelling, tenderness, or edema   Respiratory:  Normal respiratory effort, no stridor   Neuro/Psych.:  mood/affect -  normal  mental status -  normal  cranial nerves -  normal          IMPRESSION AND PLAN:   Patient is a 78-year-old woman with a  likely T2N0 SCC of the palate (given size of previous resection). The patient is now s/p palatectomy on 12/28/2017.  She has no evidence of recurrent disease on exam today.  We will continue to closely observe the area in her oral cavity for any signs of recurrence.  We have obtained photos in clinic which are saved to epic for comparison.  I will see her back in clinic in about 6 weeks with her 3 month post-treatment PET scan.    CC:  Pradip Cox MD, [facility], [facility] Office [address], [address]  [place] [address]    Again, thank you for allowing me to participate in the care of your patient.      Sincerely,    Cee Alexander MD

## 2018-02-09 NOTE — NURSING NOTE
Chief Complaint   Patient presents with     RECHECK     post op -paletectomy        Dylon Ulrich LPN

## 2018-03-23 ENCOUNTER — HOSPITAL ENCOUNTER (OUTPATIENT)
Dept: PET IMAGING | Facility: CLINIC | Age: 78
End: 2018-03-23
Attending: OTOLARYNGOLOGY
Payer: MEDICARE

## 2018-03-23 ENCOUNTER — HOSPITAL ENCOUNTER (OUTPATIENT)
Dept: PET IMAGING | Facility: CLINIC | Age: 78
Discharge: HOME | End: 2018-03-23
Attending: OTOLARYNGOLOGY | Admitting: OTOLARYNGOLOGY
Payer: MEDICARE

## 2018-03-23 ENCOUNTER — OFFICE VISIT (OUTPATIENT)
Dept: OTOLARYNGOLOGY | Facility: CLINIC | Age: 78
End: 2018-03-23
Payer: COMMERCIAL

## 2018-03-23 VITALS — WEIGHT: 174 LBS | BODY MASS INDEX: 32.85 KG/M2 | HEIGHT: 61 IN

## 2018-03-23 DIAGNOSIS — C05.9 MALIGNANT NEOPLASM OF PALATE (H): Primary | ICD-10-CM

## 2018-03-23 DIAGNOSIS — C05.9 MALIGNANT NEOPLASM OF PALATE (H): ICD-10-CM

## 2018-03-23 PROBLEM — R25.2 CRAMP OF BOTH LOWER EXTREMITIES: Status: RESOLVED | Noted: 2017-07-19 | Resolved: 2018-03-23

## 2018-03-23 PROBLEM — L11.1 GROVER'S DISEASE: Status: RESOLVED | Noted: 2017-12-21 | Resolved: 2018-03-23

## 2018-03-23 LAB — GLUCOSE BLDC GLUCOMTR-MCNC: 88 MG/DL (ref 70–99)

## 2018-03-23 PROCEDURE — A9552 F18 FDG: HCPCS | Performed by: OTOLARYNGOLOGY

## 2018-03-23 PROCEDURE — 34300033 ZZH RX 343: Performed by: OTOLARYNGOLOGY

## 2018-03-23 PROCEDURE — 82962 GLUCOSE BLOOD TEST: CPT

## 2018-03-23 PROCEDURE — 74176 CT ABD & PELVIS W/O CONTRAST: CPT

## 2018-03-23 PROCEDURE — 70490 CT SOFT TISSUE NECK W/O DYE: CPT

## 2018-03-23 RX ADMIN — FLUDEOXYGLUCOSE F-18 10.98 MCI.: 500 INJECTION, SOLUTION INTRAVENOUS at 11:45

## 2018-03-23 ASSESSMENT — PAIN SCALES - GENERAL: PAINLEVEL: NO PAIN (0)

## 2018-03-23 NOTE — NURSING NOTE
"Chief Complaint   Patient presents with     RECHECK     follow up     Height 1.55 m (5' 1.02\"), weight 78.9 kg (174 lb).    Dylon Ulrich LPN    "

## 2018-03-23 NOTE — MR AVS SNAPSHOT
After Visit Summary   3/23/2018    Krista Pelayo    MRN: [id number]           Patient Information     Date Of Birth          1940        Visit Information        Provider Department      3/23/2018 3:00 PM Cee Alexander MD [facility] Ear Nose and Throat        Today's Diagnoses     Malignant neoplasm of palate (H)    -  1      Care Instructions    1. Please follow-up in clinic in 6 weeks. We will call you with your PET scan results from today.   2. Please call the ENT clinic with any questions,concerns, new or worsening symptoms.    -Clinic number is [phone]   - Rekha's direct line (Dr. Alexander's nurse) [phone]              Follow-ups after your visit        Future tests that were ordered for you today     Open Future Orders        Priority Expected Expires Ordered    CT Soft Tissue Neck w/o Contrast Routine  2018 3/23/2018    PET Oncology Whole Body Routine  2019            Who to contact     Please call your clinic at [phone] to:    Ask questions about your health    Make or cancel appointments    Discuss your medicines    Learn about your test results    Speak to your doctor            Additional Information About Your Visit        MyChart Information     [employer] is an electronic gateway that provides easy, online access to your medical records. With [employer], you can request a clinic appointment, read your test results, renew a prescription or communicate with your care team.     To sign up for [employer] visit the website at [email or web address]   You will be asked to enter the access code listed below, as well as some personal information. Please follow the directions to create your username and password.     Your access code is: [email or web address]  Expires: 5/10/2018  6:59 PM     Your access code will  in 90 days. If you need help or a new code, please contact your [facility] or call [phone] for assistance.       "  Care EveryWhere ID     This is your Care EveryWhere ID. This could be used by other organizations to access your [facility] medical records  [id number]        Your Vitals Were     Height BMI (Body Mass Index)                1.55 m (5' 1.02\") 32.85 kg/m2           Blood Pressure from Last 3 Encounters:   12/29/17 129/56   11/10/17 120/62    Weight from Last 3 Encounters:   03/23/18 78.9 kg (174 lb)   01/03/18 79.8 kg (176 lb)   12/28/17 80.9 kg (178 lb 5.6 oz)              Today, you had the following     No orders found for display       Primary Care Provider Office Phone # Fax #    Too Cannon [phone]       [facility] [address] [address]        Equal Access to Services     [facility]: Hadii susana junior hadasho Soomaali, waaxda luqadaha, qaybta kaalmada adeegyada, graeme lemus . So [facility] [phone].    ATENCIÓN: Si habla español, tiene a rosas disposición servicios gratuitos de asistencia lingüística. Tashi al [phone].    We comply with applicable federal civil rights laws and [place] laws. We do not discriminate on the basis of race, color, national origin, age, disability, sex, sexual orientation, or gender identity.            Thank you!     Thank you for choosing [facility] EAR NOSE AND THROAT  for your care. Our goal is always to provide you with excellent care. Hearing back from our patients is one way we can continue to improve our services. Please take a few minutes to complete the written survey that you may receive in the mail after your visit with us. Thank you!             Your Updated Medication List - Protect others around you: Learn how to safely use, store and throw away your medicines at www.disposemymeds.org.          This list is accurate as of 3/23/18 11:59 PM.  Always use your most recent med list.                   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 When not flaring, use only Saturday & Sunday.        calcipotriene 0.005 % cream    DOVONOX     Apply twice daily to palms and soles.        losartan 50 MG tablet    COZAAR     Take 50 mg by mouth        metoprolol succinate 50 MG 24 hr tablet    TOPROL-XL     TAKE 1 CAPSULE BY MOUTH DAILY. DO NOT CRUSH OR CHEW        omeprazole 40 MG capsule    priLOSEC    60 capsule    Take 1 capsule (40 mg) by mouth 2 times daily        spironolactone 25 MG tablet    ALDACTONE     Take 25 mg by mouth        tazarotene 0.1 % Crea      Apply to palms and soles 3 times weekly        * Notice:  This list has 2 medication(s) that are the same as other medications prescribed for you. Read the directions carefully, and ask your doctor or other care provider to review them with you.

## 2018-03-23 NOTE — LETTER
3/23/2018       RE: Krista Pelayo  [address] [address]     Dear Colleague,    Thank you for referring your patient, Krista Pelayo, to the [facility] EAR NOSE AND THROAT at [facility]. Please see a copy of my visit note below.    Dear Dr. Cox:    I had the pleasure of seeing Krista Pelayo in follow-up today at the [facility].     History of Present Illness:   Krista Pelayo is a 78-year-old woman with a hard palate squamous cell carcinoma. She had a lesion on her palate initially noticed 8 months ago by her dentist. She was seen by Dr. Cox who performed a biopsy which was benign. She was taken to the operating room by Dr. Cox who used the radiofrequency wand to resect the palatal lesion which was a 2 cm area of resection. Final pathology from this resection was consistent with an invasive squamous cell carcinoma with positive lateral and deep margins. She had a negative PET scan.  She was taken to the OR on 12/28/2017 for palatal resection dental extraction.  Final defect was 5 x 3 cm.  All intraoperative margins were negative.  Her final pathology showed a 1.6 cm tumor with no lymphovascular space invasion or perineural invasion, 3 mm depth of invasion, negative margins with closest margin being 1 mm at the deep margin. Her pathology was reviewed at tumor board with the recommendation for close observation.    Interval history:  She comes in today for follow-up.  She was last seen in clinic February 2018.  She continues to improve from her last visit. She still has intermittent discomfort at her surgical site. She is eating without significant difficulty. She denies any neck masses. She is back to work. She had her 3 month post-treatment PET scan with final read pending.       MEDICATIONS:     Current Outpatient Prescriptions   Medication Sig Dispense Refill     acetaminophen (TYLENOL) 325 MG  tablet Take 2 tablets (650 mg) by mouth every 6 hours 50 tablet 1     ACETAMINOPHEN, ANALGESICS OTHER, Take 650 mg by mouth       augmented betamethasone dipropionate (DIPROLENE-AF) 0.05 % ointment Apply a thin layer to psoriasis on palms and soles twice daily when flaring. When not flaring, use only Saturday & Sunday.       calcipotriene (DOVONOX) 0.005 % cream Apply twice daily to palms and soles.       losartan (COZAAR) 50 MG tablet Take 50 mg by mouth       metoprolol (TOPROL-XL) 50 MG 24 hr tablet TAKE 1 CAPSULE BY MOUTH DAILY. DO NOT CRUSH OR CHEW       spironolactone (ALDACTONE) 25 MG tablet Take 25 mg by mouth       tazarotene 0.1 % CREA Apply to palms and soles 3 times weekly       omeprazole (PRILOSEC) 40 MG capsule Take 1 capsule (40 mg) by mouth 2 times daily 60 capsule 0       ALLERGIES:    Allergies   Allergen Reactions     Atorvastatin      Myalgias     Codeine Nausea and Vomiting     chills     Fluconazole      Other reaction(s): Other  Lip swelling associated with sores      Hydrocodone-Acetaminophen Nausea and Vomiting     Meperidine Nausea and Vomiting     Pravastatin      Other reaction(s): Muscle weakness     Propoxyphene N-Apap Nausea and Vomiting     Ultram [Tramadol] Swelling     numbness face, arms, difficulity with speech     Adhesive Tape Rash     Latex Rash     Morphine Rash     No Clinical Screening - See Comments Rash and Swelling     ADHESIVE TAPE, NARCOTICS  red streaks on arms  TREES     Sulfa Drugs Rash       HABITS/SOCIAL HISTORY:   She has smoked occasionally in the past, denies any chewing tobacco use, drinks occasional alcohol.   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     Social History     Social History     Marital status:      Spouse name: N/A     Number of children: N/A     Years of education: N/A     Occupational History     Not on file.     Social History Main Topics     Smoking status: Former Smoker     Smokeless tobacco: Never Used       "Comment: only smoked briefly as a teenager      Alcohol use Yes      Comment: 2x a month     Drug use: No     Sexual activity: Not on file     Other Topics Concern     Not on file     Social History Narrative       PAST MEDICAL HISTORY:   Past Medical History:   Diagnosis Date     Cancer (H)      Hypertension      PONV (postoperative nausea and vomiting)         PAST SURGICAL HISTORY:   Past Surgical History:   Procedure Laterality Date     ESOPHAGOSCOPY, GASTROSCOPY, DUODENOSCOPY (EGD), COMBINED N/A 11/10/2017    Procedure: COMBINED ESOPHAGOSCOPY, GASTROSCOPY, DUODENOSCOPY (EGD);;  Surgeon: Matt Zarate MD;  Location:  GI     GENITOURINARY SURGERY      KIDNEY, STENT     ORTHOPEDIC SURGERY      ltk     PALATECTOMY Right 12/28/2017    Procedure: PALATECTOMY;  Right Palatectomy, Tongue Biopsy, Dental Extraction and NGT Placement;  Surgeon: Cee Alexander MD;  Location:  OR       FAMILY HISTORY:  No family history on file.    REVIEW OF SYSTEMS:  12 point ROS was negative other than the symptoms noted above in the HPI.  Patient Supplied Answers to Review of Systems  [facility] ROS 3/23/2018   Ears, Nose, Throat -   Musculoskeletal -   Allergy/Immunology -   Other Problems with anesthesia in surgery         PHYSICAL EXAMINATION:   Ht 1.55 m (5' 1.02\")  Wt 78.9 kg (174 lb)  BMI 32.85 kg/m2  Appearance:   normal; NAD, age-appropriate appearance, well-developed, normal habitus   Communication:   normal; communicates verbally, normal voice quality   Head/Face:   inspection -  Normal; no scars or visible lesions   Salivary glands -  Normal size, no tenderness, swelling, or palpable masses   Facial strength -  Normal and symmetric bilateral; H/B I/VI   Skin:  normal, no rash   Ocular Motility:  normal occular movements   Ears:  auricle (AD) -  normal  EAC (AD) -  normal  TM (AD) -  Normal, no effusion  auricle (AS) -  normal  EAC (AS) -  normal  TM (AS) -  Normal, no effusion  Normal clinical speech reception   Nose: "  Ext. inspection -  Normal   Oral Cavity:  lips -  Normal mucosa, oral competence, and stoma size   Age-appropriate dentition with missing teeth on the right side secondary to the resection  Right maxillary alveolus healed without any exposed bone, no leukoplakia, intermittent tender to palpation but no masses  Hard palate on the right side healed without leukoplakia, no exposed bone, no palpable masses.   Tongue - normal movement, no lesions   Oropharynx:  mucosa -  Normal, no lesions  soft palate -  Normal, no lesions, no asymmetry, normal elevation  tonsils -  Normal, no exudates, no abnormal lesions, symmetric   Neck: No visible mass or asymmetry   Normal palpation, no tenderness, no tracheal deviation  Normal range of motion   Lymphatic:  no abnormal nodes   Cardiovascular:  warm, pink, well-perfused extremities without swelling, tenderness, or edema   Respiratory:  Normal respiratory effort, no stridor   Neuro/Psych.:  mood/affect -  normal  mental status -  normal  cranial nerves -  normal        RESULTS REVIEWED:  I independently reviewed the PET/CT scan images which showed no obvious tumor recurrence in the hard palate or in the neck    IMPRESSION AND PLAN:   Krista Pelayo is a 78-year-old woman with a likely T2N0 SCC of the palate (given size of previous resection). The patient is now s/p palatectomy on 12/28/2017.  She has no evidence of recurrent disease on exam today.  We will continue to closely observe the area in her oral cavity for any signs of recurrence.  We will contact her with the final read from the PET scan next week. I will see her back in clinic in about 6 weeks.    Thank you very much for the opportunity to participate in the care of your patient.      CC:  Pradip Cox MD, [facility], [facility] Office [address], [address]  [place]  [address]      Again, thank you for allowing me to participate in the care of your  patient.      Sincerely,    Cee Alexander MD

## 2018-03-23 NOTE — PATIENT INSTRUCTIONS
1. Please follow-up in clinic in 6 weeks. We will call you with your PET scan results from today.   2. Please call the ENT clinic with any questions,concerns, new or worsening symptoms.    -Clinic number is [phone]   - Rekha's direct line (Dr. Alexander's nurse) [phone]

## 2018-03-24 NOTE — PROGRESS NOTES
Dear Dr. Cox:    I had the pleasure of seeing Krista Pelayo in follow-up today at the [facility].     History of Present Illness:   Krista Pelayo is a 78-year-old woman with a hard palate squamous cell carcinoma. She had a lesion on her palate initially noticed 8 months ago by her dentist. She was seen by Dr. Cox who performed a biopsy which was benign. She was taken to the operating room by Dr. Cox who used the radiofrequency wand to resect the palatal lesion which was a 2 cm area of resection. Final pathology from this resection was consistent with an invasive squamous cell carcinoma with positive lateral and deep margins. She had a negative PET scan.  She was taken to the OR on 12/28/2017 for palatal resection dental extraction.  Final defect was 5 x 3 cm.  All intraoperative margins were negative.  Her final pathology showed a 1.6 cm tumor with no lymphovascular space invasion or perineural invasion, 3 mm depth of invasion, negative margins with closest margin being 1 mm at the deep margin. Her pathology was reviewed at tumor board with the recommendation for close observation.    Interval history:  She comes in today for follow-up.  She was last seen in clinic February 2018.  She continues to improve from her last visit. She still has intermittent discomfort at her surgical site. She is eating without significant difficulty. She denies any neck masses. She is back to work. She had her 3 month post-treatment PET scan with final read pending.       MEDICATIONS:     Current Outpatient Prescriptions   Medication Sig Dispense Refill     acetaminophen (TYLENOL) 325 MG tablet Take 2 tablets (650 mg) by mouth every 6 hours 50 tablet 1     ACETAMINOPHEN, ANALGESICS OTHER, Take 650 mg by mouth       augmented betamethasone dipropionate (DIPROLENE-AF) 0.05 % ointment Apply a thin layer to psoriasis on palms and soles twice daily when flaring. When not flaring, use  only Saturday & Sunday.       calcipotriene (DOVONOX) 0.005 % cream Apply twice daily to palms and soles.       losartan (COZAAR) 50 MG tablet Take 50 mg by mouth       metoprolol (TOPROL-XL) 50 MG 24 hr tablet TAKE 1 CAPSULE BY MOUTH DAILY. DO NOT CRUSH OR CHEW       spironolactone (ALDACTONE) 25 MG tablet Take 25 mg by mouth       tazarotene 0.1 % CREA Apply to palms and soles 3 times weekly       omeprazole (PRILOSEC) 40 MG capsule Take 1 capsule (40 mg) by mouth 2 times daily 60 capsule 0       ALLERGIES:    Allergies   Allergen Reactions     Atorvastatin      Myalgias     Codeine Nausea and Vomiting     chills     Fluconazole      Other reaction(s): Other  Lip swelling associated with sores      Hydrocodone-Acetaminophen Nausea and Vomiting     Meperidine Nausea and Vomiting     Pravastatin      Other reaction(s): Muscle weakness     Propoxyphene N-Apap Nausea and Vomiting     Ultram [Tramadol] Swelling     numbness face, arms, difficulity with speech     Adhesive Tape Rash     Latex Rash     Morphine Rash     No Clinical Screening - See Comments Rash and Swelling     ADHESIVE TAPE, NARCOTICS  red streaks on arms  TREES     Sulfa Drugs Rash       HABITS/SOCIAL HISTORY:   She has smoked occasionally in the past, denies any chewing tobacco use, drinks occasional alcohol.   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     Social History     Social History     Marital status:      Spouse name: N/A     Number of children: N/A     Years of education: N/A     Occupational History     Not on file.     Social History Main Topics     Smoking status: Former Smoker     Smokeless tobacco: Never Used      Comment: only smoked briefly as a teenager      Alcohol use Yes      Comment: 2x a month     Drug use: No     Sexual activity: Not on file     Other Topics Concern     Not on file     Social History Narrative       PAST MEDICAL HISTORY:   Past Medical History:   Diagnosis Date     Cancer (H)       "Hypertension      PONV (postoperative nausea and vomiting)         PAST SURGICAL HISTORY:   Past Surgical History:   Procedure Laterality Date     ESOPHAGOSCOPY, GASTROSCOPY, DUODENOSCOPY (EGD), COMBINED N/A 11/10/2017    Procedure: COMBINED ESOPHAGOSCOPY, GASTROSCOPY, DUODENOSCOPY (EGD);;  Surgeon: Matt Zarate MD;  Location:  GI     GENITOURINARY SURGERY      KIDNEY, STENT     ORTHOPEDIC SURGERY      ltk     PALATECTOMY Right 12/28/2017    Procedure: PALATECTOMY;  Right Palatectomy, Tongue Biopsy, Dental Extraction and NGT Placement;  Surgeon: Cee Alexander MD;  Location:  OR       FAMILY HISTORY:  No family history on file.    REVIEW OF SYSTEMS:  12 point ROS was negative other than the symptoms noted above in the HPI.  Patient Supplied Answers to Review of Systems  [facility] ROS 3/23/2018   Ears, Nose, Throat -   Musculoskeletal -   Allergy/Immunology -   Other Problems with anesthesia in surgery         PHYSICAL EXAMINATION:   Ht 1.55 m (5' 1.02\")  Wt 78.9 kg (174 lb)  BMI 32.85 kg/m2  Appearance:   normal; NAD, age-appropriate appearance, well-developed, normal habitus   Communication:   normal; communicates verbally, normal voice quality   Head/Face:   inspection -  Normal; no scars or visible lesions   Salivary glands -  Normal size, no tenderness, swelling, or palpable masses   Facial strength -  Normal and symmetric bilateral; H/B I/VI   Skin:  normal, no rash   Ocular Motility:  normal occular movements   Ears:  auricle (AD) -  normal  EAC (AD) -  normal  TM (AD) -  Normal, no effusion  auricle (AS) -  normal  EAC (AS) -  normal  TM (AS) -  Normal, no effusion  Normal clinical speech reception   Nose:  Ext. inspection -  Normal   Oral Cavity:  lips -  Normal mucosa, oral competence, and stoma size   Age-appropriate dentition with missing teeth on the right side secondary to the resection  Right maxillary alveolus healed without any exposed bone, no leukoplakia, intermittent tender to palpation " but no masses  Hard palate on the right side healed without leukoplakia, no exposed bone, no palpable masses.   Tongue - normal movement, no lesions   Oropharynx:  mucosa -  Normal, no lesions  soft palate -  Normal, no lesions, no asymmetry, normal elevation  tonsils -  Normal, no exudates, no abnormal lesions, symmetric   Neck: No visible mass or asymmetry   Normal palpation, no tenderness, no tracheal deviation  Normal range of motion   Lymphatic:  no abnormal nodes   Cardiovascular:  warm, pink, well-perfused extremities without swelling, tenderness, or edema   Respiratory:  Normal respiratory effort, no stridor   Neuro/Psych.:  mood/affect -  normal  mental status -  normal  cranial nerves -  normal        RESULTS REVIEWED:  I independently reviewed the PET/CT scan images which showed no obvious tumor recurrence in the hard palate or in the neck    IMPRESSION AND PLAN:   Krista Pelayo is a 78-year-old woman with a likely T2N0 SCC of the palate (given size of previous resection). The patient is now s/p palatectomy on 12/28/2017.  She has no evidence of recurrent disease on exam today.  We will continue to closely observe the area in her oral cavity for any signs of recurrence.  We will contact her with the final read from the PET scan next week. I will see her back in clinic in about 6 weeks.    Thank you very much for the opportunity to participate in the care of your patient.        Cee Alexander M.D.  Otolaryngology- Head & Neck Surgery      CC:  Pradip Cox MD, [facility], [facility] Office [address], [address]  [place] [address]

## 2018-03-28 ENCOUNTER — CARE COORDINATION (OUTPATIENT)
Dept: OTOLARYNGOLOGY | Facility: CLINIC | Age: 78
End: 2018-03-28

## 2018-03-28 NOTE — PROGRESS NOTES
Patient was called with PET scan results below which were reviewed with Dr. Alexander:      Impression:   1. Stable surgical changes of hard palatectomy without evidence for  residual or recurrent disease in the resection bed.   2. Right level 2A lymph node is decreased in size and metabolic  activity when compared with prior exam, favored to be reactive.  3. Mildly metabolically active right axillary lymph node maintains  benign morphology. Attention on follow-up examinations.  4. Please refer to the whole body PET CT performed as a separate  report, for the findings of the remainder of the body.    IMPRESSION:   1. In this patient with history of squamous cell carcinoma of the hard  palate, status post palatectomy:  a. Stable 6 mm subsolid pulmonary nodule in the right lower lobe when  compared with CT dated 11/10/2017. Recommend continued attention on  follow-up examinations.  b. New hypermetabolic right inguinal lymph node measuring up to 1.5 cm  short axis diameter, maximum SUV 5.8. Consider tissue sampling.   c. Right axillary lymph node with mildly increased metabolic activity  with maintenance of normal morphology, possibly reactive. Recommend  attention on follow-up examinations.  2. Hypermetabolic activity at the gastroesophageal junction and  gastric cardia, suggests esophagitis/gastritis.  3. Mucosal thickening and focal FDG avidity at the rectum. Though this  is nonspecific and may represent hemorrhoids, fissure or physiologic  excretion, underlying malignancy cannot be excluded. Recommend direct  visualization.  4. Skin thickening and increased metabolic activity within the  superficial soft tissues overlying the right and left calcaneus, may  represent ulceration and/or dermatitis.    Results were sent to patient's PCP and she will follow-up with Dr. Cannon at [facility] in [place]. Patient states that she would like to discuss the results and also discuss with her primary care doctor the new hypermetabolic  right inguinal lymph node and potential biopsy. Patient will call writer once she has met with Dr. Cannon. Patient agreeable to plan and was encouraged to call with further questions or concerns.  PET scan results were faxed with Dr. Cannon at [facility].     Rekha Mcduffie, RN, BSN

## 2018-04-05 ENCOUNTER — CARE COORDINATION (OUTPATIENT)
Dept: OTOLARYNGOLOGY | Facility: CLINIC | Age: 78
End: 2018-04-05

## 2018-04-05 NOTE — PROGRESS NOTES
Patient's daughter called to discuss the PET scan results that were reviewed with patient last week. PET scan findings were reviewed with daughter. Patient wished to review the findings with her PCP. Patient is scheduled to see her PCP in 2 weeks and at that point she was going to discuss his opinion of wanting to sample the new right inguinal lymph node. Patient will call back after she sees her PCP.     Rekha Mcduffie RN, BSN

## 2018-07-13 ENCOUNTER — TELEPHONE (OUTPATIENT)
Dept: OTOLARYNGOLOGY | Facility: CLINIC | Age: 78
End: 2018-07-13
Payer: COMMERCIAL

## 2018-07-13 NOTE — TELEPHONE ENCOUNTER
[facility] Call Center    Phone Message    May a detailed message be left on voicemail: yes    Reason for Call: Patient daughter requesting a referral for ENT closer to home as patient had an appointment today but got lost and could not make it and needs to be seen somewhere closer to her home    Action Taken: Message routed to:  [facility] ([facility] ENT

## 2025-03-01 NOTE — NURSING NOTE
"Chief Complaint   Patient presents with     RECHECK     Post op Palatectomy      Height 1.575 m (5' 2\"), weight 79.8 kg (176 lb).    Mohamud Baird    " Fabi Siddiqui(Attending)

## (undated) DEVICE — DRSG TELFA 3X8" 1238

## (undated) DEVICE — ESU ELEC BLADE 2.75" COATED/INSULATED E1455

## (undated) DEVICE — DRSG XEROFORM 5X9" CUR253590W

## (undated) DEVICE — Device

## (undated) DEVICE — SOL WATER IRRIG 1000ML BOTTLE 2F7114

## (undated) DEVICE — ESU PENCIL SMOKE EVAC W/ROCKER SWITCH 0703-047-000

## (undated) DEVICE — ESU GROUND PAD ADULT W/CORD E7507

## (undated) DEVICE — SUCTION MANIFOLD DORNOCH ULTRA CART UL-CL500

## (undated) DEVICE — PUNCH SKIN 4MM P450

## (undated) DEVICE — BUR STRK ROUND DIAMOND 4.0X54MM COARSE 1608-006-095

## (undated) DEVICE — BLADE KNIFE SURG 15 371115

## (undated) DEVICE — SOL NACL 0.9% IRRIG 1000ML BOTTLE 2F7124

## (undated) DEVICE — BUR STRK ROUND 6.3X21.8MM FAST CUT 8 FLUTE 1608-006-143

## (undated) DEVICE — SU SILK 3-0 TIE 12X30" A304H

## (undated) DEVICE — SU SILK 2-0 TIE 12X30" A305H

## (undated) DEVICE — BUR STRK SIDE CUT 1.2X5.1X44.8MM CARBIDE 6FLUTE 1607-002-105

## (undated) DEVICE — TUBE NASOGASTRIC ENTRIFLEX 10FR 43"

## (undated) DEVICE — SPONGE KITTNER 30-101

## (undated) DEVICE — CLIP HORIZON MED BLUE 002200

## (undated) DEVICE — SU VICRYL 3-0 SH 8X18" UND J864D

## (undated) DEVICE — LINEN TOWEL PACK X6 WHITE 5487

## (undated) DEVICE — SU SILK 2-0 SH CR 5X18" C0125

## (undated) DEVICE — ESU SUCTION CAUTERY 10FR FOOT CONTROL E2505-10FR

## (undated) DEVICE — ESU ELEC NDL 1" COATED/INSULATED E1465

## (undated) DEVICE — CLIP HORIZON SM RED WIDE SLOT 001201

## (undated) DEVICE — LINEN TOWEL PACK X5 5464

## (undated) RX ORDER — LIDOCAINE HYDROCHLORIDE 20 MG/ML
INJECTION, SOLUTION EPIDURAL; INFILTRATION; INTRACAUDAL; PERINEURAL
Status: DISPENSED
Start: 2017-12-28

## (undated) RX ORDER — SODIUM CHLORIDE, SODIUM LACTATE, POTASSIUM CHLORIDE, CALCIUM CHLORIDE 600; 310; 30; 20 MG/100ML; MG/100ML; MG/100ML; MG/100ML
INJECTION, SOLUTION INTRAVENOUS
Status: DISPENSED
Start: 2017-12-28

## (undated) RX ORDER — ONDANSETRON 2 MG/ML
INJECTION INTRAMUSCULAR; INTRAVENOUS
Status: DISPENSED
Start: 2017-12-28

## (undated) RX ORDER — DEXAMETHASONE SODIUM PHOSPHATE 4 MG/ML
INJECTION, SOLUTION INTRA-ARTICULAR; INTRALESIONAL; INTRAMUSCULAR; INTRAVENOUS; SOFT TISSUE
Status: DISPENSED
Start: 2017-12-28

## (undated) RX ORDER — EPHEDRINE SULFATE 50 MG/ML
INJECTION, SOLUTION INTRAMUSCULAR; INTRAVENOUS; SUBCUTANEOUS
Status: DISPENSED
Start: 2017-12-28

## (undated) RX ORDER — FENTANYL CITRATE 50 UG/ML
INJECTION, SOLUTION INTRAMUSCULAR; INTRAVENOUS
Status: DISPENSED
Start: 2017-12-28

## (undated) RX ORDER — HYDROMORPHONE HCL/0.9% NACL/PF 0.2MG/0.2
SYRINGE (ML) INTRAVENOUS
Status: DISPENSED
Start: 2017-12-28

## (undated) RX ORDER — PROPOFOL 10 MG/ML
INJECTION, EMULSION INTRAVENOUS
Status: DISPENSED
Start: 2017-12-28

## (undated) RX ORDER — LABETALOL HYDROCHLORIDE 5 MG/ML
INJECTION, SOLUTION INTRAVENOUS
Status: DISPENSED
Start: 2017-12-28

## (undated) RX ORDER — AMPICILLIN AND SULBACTAM 2; 1 G/1; G/1
INJECTION, POWDER, FOR SOLUTION INTRAMUSCULAR; INTRAVENOUS
Status: DISPENSED
Start: 2017-12-28